# Patient Record
Sex: FEMALE | Race: WHITE | NOT HISPANIC OR LATINO | Employment: PART TIME | URBAN - METROPOLITAN AREA
[De-identification: names, ages, dates, MRNs, and addresses within clinical notes are randomized per-mention and may not be internally consistent; named-entity substitution may affect disease eponyms.]

---

## 2017-03-09 PROBLEM — L57.0 ACTINIC KERATOSIS: Status: ACTIVE | Noted: 2017-03-09

## 2017-03-09 PROBLEM — R53.82 CHRONIC FATIGUE: Chronic | Status: ACTIVE | Noted: 2017-03-09

## 2017-03-09 PROBLEM — L57.0 ACTINIC KERATOSIS: Chronic | Status: ACTIVE | Noted: 2017-03-09

## 2017-03-09 PROBLEM — E11.9 TYPE 2 DIABETES MELLITUS WITHOUT COMPLICATION: Chronic | Status: ACTIVE | Noted: 2017-03-09

## 2017-03-09 PROBLEM — R53.82 CHRONIC FATIGUE: Status: ACTIVE | Noted: 2017-03-09

## 2017-05-14 PROBLEM — M85.89 OSTEOPENIA OF MULTIPLE SITES: Status: ACTIVE | Noted: 2017-05-14

## 2017-09-13 PROBLEM — F10.90 ALCOHOL USE: Status: ACTIVE | Noted: 2017-09-13

## 2017-09-13 PROBLEM — Z78.9 ALCOHOL USE: Status: ACTIVE | Noted: 2017-09-13

## 2019-02-26 PROBLEM — I11.9 BENIGN HYPERTENSIVE HEART DISEASE WITHOUT HEART FAILURE: Status: ACTIVE | Noted: 2019-02-26

## 2019-04-24 ENCOUNTER — TELEPHONE (OUTPATIENT)
Dept: CARDIOLOGY | Facility: CLINIC | Age: 74
End: 2019-04-24

## 2019-04-24 NOTE — TELEPHONE ENCOUNTER
----- Message from Lizeth Jauregui sent at 4/24/2019  2:57 PM CDT -----  Contact: patient  Type: Needs Medical Advice    Who Called:  jose miguel  Symptoms (please be specific):  la  How long has patient had these symptoms:  la  Pharmacy name and phone #:  la  Best Call Back Number: [phone] (home)     Additional Information: Patient states that she would like new patient forms mailed to her before apt on may 22nd. .Please call to advise. Thanks!

## 2019-04-24 NOTE — TELEPHONE ENCOUNTER
Spoke to pt. Informed her every thing will be taking care of when she check in all forms are on the computer. Pt. Verbalized understanding.

## 2019-05-22 ENCOUNTER — OFFICE VISIT (OUTPATIENT)
Dept: CARDIOLOGY | Facility: CLINIC | Age: 74
End: 2019-05-22
Payer: MEDICARE

## 2019-05-22 VITALS
HEIGHT: 64 IN | WEIGHT: 180.75 LBS | BODY MASS INDEX: 30.86 KG/M2 | SYSTOLIC BLOOD PRESSURE: 121 MMHG | DIASTOLIC BLOOD PRESSURE: 74 MMHG | HEART RATE: 45 BPM

## 2019-05-22 DIAGNOSIS — I11.9 BENIGN HYPERTENSIVE HEART DISEASE WITHOUT HEART FAILURE: Primary | ICD-10-CM

## 2019-05-22 DIAGNOSIS — Z95.1 S/P CABG (CORONARY ARTERY BYPASS GRAFT): ICD-10-CM

## 2019-05-22 DIAGNOSIS — I25.10 ATHEROSCLEROSIS OF NATIVE CORONARY ARTERY OF NATIVE HEART WITHOUT ANGINA PECTORIS: Chronic | ICD-10-CM

## 2019-05-22 PROCEDURE — 1101F PR PT FALLS ASSESS DOC 0-1 FALLS W/OUT INJ PAST YR: ICD-10-PCS | Mod: CPTII,S$GLB,, | Performed by: INTERNAL MEDICINE

## 2019-05-22 PROCEDURE — 3044F PR MOST RECENT HEMOGLOBIN A1C LEVEL <7.0%: ICD-10-PCS | Mod: CPTII,S$GLB,, | Performed by: INTERNAL MEDICINE

## 2019-05-22 PROCEDURE — 99205 OFFICE O/P NEW HI 60 MIN: CPT | Mod: S$GLB,,, | Performed by: INTERNAL MEDICINE

## 2019-05-22 PROCEDURE — 3078F PR MOST RECENT DIASTOLIC BLOOD PRESSURE < 80 MM HG: ICD-10-PCS | Mod: CPTII,S$GLB,, | Performed by: INTERNAL MEDICINE

## 2019-05-22 PROCEDURE — 3074F SYST BP LT 130 MM HG: CPT | Mod: CPTII,S$GLB,, | Performed by: INTERNAL MEDICINE

## 2019-05-22 PROCEDURE — 3074F PR MOST RECENT SYSTOLIC BLOOD PRESSURE < 130 MM HG: ICD-10-PCS | Mod: CPTII,S$GLB,, | Performed by: INTERNAL MEDICINE

## 2019-05-22 PROCEDURE — 1101F PT FALLS ASSESS-DOCD LE1/YR: CPT | Mod: CPTII,S$GLB,, | Performed by: INTERNAL MEDICINE

## 2019-05-22 PROCEDURE — 99205 PR OFFICE/OUTPT VISIT, NEW, LEVL V, 60-74 MIN: ICD-10-PCS | Mod: S$GLB,,, | Performed by: INTERNAL MEDICINE

## 2019-05-22 PROCEDURE — 3044F HG A1C LEVEL LT 7.0%: CPT | Mod: CPTII,S$GLB,, | Performed by: INTERNAL MEDICINE

## 2019-05-22 PROCEDURE — 3078F DIAST BP <80 MM HG: CPT | Mod: CPTII,S$GLB,, | Performed by: INTERNAL MEDICINE

## 2019-05-22 PROCEDURE — 99999 PR PBB SHADOW E&M-EST. PATIENT-LVL III: ICD-10-PCS | Mod: PBBFAC,,, | Performed by: INTERNAL MEDICINE

## 2019-05-22 PROCEDURE — 99999 PR PBB SHADOW E&M-EST. PATIENT-LVL III: CPT | Mod: PBBFAC,,, | Performed by: INTERNAL MEDICINE

## 2019-05-22 RX ORDER — LOSARTAN POTASSIUM AND HYDROCHLOROTHIAZIDE 12.5; 5 MG/1; MG/1
1 TABLET ORAL DAILY
Qty: 90 TABLET | Refills: 3 | Status: SHIPPED | OUTPATIENT
Start: 2019-05-22 | End: 2019-10-23 | Stop reason: SDUPTHER

## 2019-05-22 RX ORDER — CARVEDILOL 6.25 MG/1
6.25 TABLET ORAL 2 TIMES DAILY WITH MEALS
Qty: 180 TABLET | Refills: 5 | Status: SHIPPED | OUTPATIENT
Start: 2019-05-22 | End: 2019-10-23 | Stop reason: SDUPTHER

## 2019-05-22 RX ORDER — METHIMAZOLE 5 MG/1
TABLET ORAL
Refills: 2 | COMMUNITY
Start: 2019-04-30 | End: 2020-08-25

## 2019-05-22 RX ORDER — GLUCOSAMINE/CHONDRO SU A 500-400 MG
1 TABLET ORAL 3 TIMES DAILY
COMMUNITY
End: 2020-09-15

## 2019-05-22 NOTE — PROGRESS NOTES
Subjective:    Patient ID:  Roseanne Gonzalez is a 73 y.o. female who presents for evaluation of Consult (establish care / heart history)      HPI  Here to establish/transfer care from Dr. Sommer concerning  CABG x4 (2007), DLP, HTN.  Had classic exertional angina a the time. Patients states is doing well no chest pain, SOB or change in exertional tolerence.       Review of Systems   Constitution: Negative for malaise/fatigue.   Eyes: Negative for blurred vision.   Cardiovascular: Negative for chest pain, claudication, cyanosis, dyspnea on exertion, irregular heartbeat, leg swelling, near-syncope, orthopnea, palpitations, paroxysmal nocturnal dyspnea and syncope.   Respiratory: Negative for cough and shortness of breath.    Hematologic/Lymphatic: Does not bruise/bleed easily.   Musculoskeletal: Negative for back pain, falls, joint pain, muscle cramps, muscle weakness and myalgias.   Gastrointestinal: Negative for abdominal pain, change in bowel habit, nausea and vomiting.   Genitourinary: Negative for urgency.   Neurological: Negative for dizziness, focal weakness and light-headedness.        Objective:    Physical Exam   Constitutional: She is oriented to person, place, and time. She appears well-developed and well-nourished.   HENT:   Head: Normocephalic.   Eyes: Conjunctivae are normal.   Neck: Normal range of motion. Neck supple. No JVD present.   Cardiovascular: Normal rate, regular rhythm, normal heart sounds and intact distal pulses.   Pulses:       Carotid pulses are 2+ on the right side, and 2+ on the left side.       Radial pulses are 2+ on the right side, and 2+ on the left side.        Dorsalis pedis pulses are 2+ on the right side, and 2+ on the left side.        Posterior tibial pulses are 2+ on the right side, and 2+ on the left side.   Pulmonary/Chest: Effort normal and breath sounds normal.   Abdominal: Soft. Bowel sounds are normal.   Musculoskeletal: She exhibits no edema or tenderness.    Neurological: She is alert and oriented to person, place, and time. Gait normal.   Skin: Skin is warm, dry and intact. No cyanosis. Nails show no clubbing.   Psychiatric: She has a normal mood and affect. Her speech is normal and behavior is normal. Thought content normal.   Nursing note and vitals reviewed.          .    Chemistry        Component Value Date/Time     03/01/2019 0812    K 4.5 03/01/2019 0812     03/01/2019 0812    CO2 26 03/01/2019 0812    BUN 22 03/01/2019 0812    CREATININE 0.85 03/01/2019 0812     (H) 03/01/2019 0812        Component Value Date/Time    CALCIUM 9.4 03/01/2019 0812    ALKPHOS 49 02/13/2019 1547    AST 28 02/13/2019 1547    ALT 33 02/13/2019 1547    BILITOT 0.6 02/13/2019 1547    ESTGFRAFRICA 80 03/01/2019 0812    EGFRNONAA 69 03/01/2019 0812            ..  Lab Results   Component Value Date    CHOL 151 03/01/2019    CHOL 109 (L) 11/06/2017    CHOL 135 08/31/2017     Lab Results   Component Value Date    HDL 61 03/01/2019    HDL 45 11/06/2017    HDL 45 08/31/2017     Lab Results   Component Value Date    LDLCALC 72 03/01/2019    LDLCALC 48.6 (L) 11/06/2017    LDLCALC 67.8 08/31/2017     Lab Results   Component Value Date    TRIG 93 03/01/2019    TRIG 77 11/06/2017    TRIG 111 08/31/2017     Lab Results   Component Value Date    CHOLHDL 2.5 03/01/2019    CHOLHDL 41.3 11/06/2017    CHOLHDL 33.3 08/31/2017     ..  Lab Results   Component Value Date    WBC 11.44 02/13/2019    HGB 16.3 (H) 02/13/2019    HCT 46.9 02/13/2019    MCV 92 02/13/2019     02/13/2019       Test(s) Reviewed  I have reviewed the following in detail:  [x] Stress test  10/2018- neg   [] Angiography   [x]      [] Labs   [] Other:       Assessment:         ICD-10-CM ICD-9-CM   1. Benign hypertensive heart disease without heart failure I11.9 402.10   2. Atherosclerosis of native coronary artery of native heart without angina pectoris I25.10 414.01   3. Uncontrolled type 2 diabetes mellitus  with diabetic peripheral angiopathy without gangrene, with long-term current use of insulin E11.51 250.72    E11.65 443.81    Z79.4 V58.67     Problem List Items Addressed This Visit     Uncontrolled type 2 diabetes mellitus with diabetic peripheral angiopathy without gangrene, with long-term current use of insulin (Chronic)    Coronary artery disease involving native coronary artery of native heart without angina pectoris (Chronic)    Benign hypertensive heart disease without heart failure - Primary           Plan:           Return to clinic 4 months   Low level/low impact aerobic exercise 5x's/wk. Heart healthy diet and risk factor modification.    See labs and med orders.  Decrease coreg  Dc aldactone/hctz add losartan/hctz for renal protection.    Portions of this note may have been created with voice recognition software.  Grammatical, syntax and spelling errors may be inevitable.

## 2019-10-16 ENCOUNTER — LAB VISIT (OUTPATIENT)
Dept: LAB | Facility: HOSPITAL | Age: 74
End: 2019-10-16
Attending: INTERNAL MEDICINE
Payer: MEDICARE

## 2019-10-16 ENCOUNTER — CLINICAL SUPPORT (OUTPATIENT)
Dept: CARDIOLOGY | Facility: CLINIC | Age: 74
End: 2019-10-16
Attending: INTERNAL MEDICINE
Payer: MEDICARE

## 2019-10-16 DIAGNOSIS — I11.9 BENIGN HYPERTENSIVE HEART DISEASE WITHOUT HEART FAILURE: ICD-10-CM

## 2019-10-16 DIAGNOSIS — I25.10 ATHEROSCLEROSIS OF NATIVE CORONARY ARTERY OF NATIVE HEART WITHOUT ANGINA PECTORIS: ICD-10-CM

## 2019-10-16 DIAGNOSIS — Z95.1 S/P CABG (CORONARY ARTERY BYPASS GRAFT): ICD-10-CM

## 2019-10-16 DIAGNOSIS — I25.10 ATHEROSCLEROSIS OF NATIVE CORONARY ARTERY OF NATIVE HEART WITHOUT ANGINA PECTORIS: Chronic | ICD-10-CM

## 2019-10-16 LAB
ALBUMIN SERPL BCP-MCNC: 4.2 G/DL (ref 3.5–5.2)
ALP SERPL-CCNC: 51 U/L (ref 55–135)
ALT SERPL W/O P-5'-P-CCNC: 19 U/L (ref 10–44)
ANION GAP SERPL CALC-SCNC: 8 MMOL/L (ref 8–16)
AST SERPL-CCNC: 24 U/L (ref 10–40)
BILIRUB SERPL-MCNC: 0.6 MG/DL (ref 0.1–1)
BUN SERPL-MCNC: 14 MG/DL (ref 8–23)
CALCIUM SERPL-MCNC: 9.6 MG/DL (ref 8.7–10.5)
CHLORIDE SERPL-SCNC: 103 MMOL/L (ref 95–110)
CHOLEST SERPL-MCNC: 159 MG/DL (ref 120–199)
CHOLEST/HDLC SERPL: 3.1 {RATIO} (ref 2–5)
CO2 SERPL-SCNC: 30 MMOL/L (ref 23–29)
CREAT SERPL-MCNC: 0.9 MG/DL (ref 0.5–1.4)
EST. GFR  (AFRICAN AMERICAN): >60 ML/MIN/1.73 M^2
EST. GFR  (NON AFRICAN AMERICAN): >60 ML/MIN/1.73 M^2
GLUCOSE SERPL-MCNC: 115 MG/DL (ref 70–110)
HDLC SERPL-MCNC: 51 MG/DL (ref 40–75)
HDLC SERPL: 32.1 % (ref 20–50)
LDLC SERPL CALC-MCNC: 90 MG/DL (ref 63–159)
NONHDLC SERPL-MCNC: 108 MG/DL
POTASSIUM SERPL-SCNC: 4.2 MMOL/L (ref 3.5–5.1)
PROT SERPL-MCNC: 6.8 G/DL (ref 6–8.4)
SODIUM SERPL-SCNC: 141 MMOL/L (ref 136–145)
TRIGL SERPL-MCNC: 90 MG/DL (ref 30–150)

## 2019-10-16 PROCEDURE — 36415 COLL VENOUS BLD VENIPUNCTURE: CPT | Mod: PO

## 2019-10-16 PROCEDURE — 93880 EXTRACRANIAL BILAT STUDY: CPT | Mod: S$GLB,,, | Performed by: INTERNAL MEDICINE

## 2019-10-16 PROCEDURE — 93880 CV US DOPPLER CAROTID (CUPID ONLY): ICD-10-PCS | Mod: S$GLB,,, | Performed by: INTERNAL MEDICINE

## 2019-10-16 PROCEDURE — 80061 LIPID PANEL: CPT

## 2019-10-16 PROCEDURE — 80053 COMPREHEN METABOLIC PANEL: CPT

## 2019-10-17 LAB
LEFT ARM DIASTOLIC BLOOD PRESSURE: 70 MMHG
LEFT ARM SYSTOLIC BLOOD PRESSURE: 125 MMHG
LEFT CBA DIAS: 20 CM/S
LEFT CBA SYS: 69 CM/S
LEFT CCA DIST DIAS: 16 CM/S
LEFT CCA DIST SYS: 62 CM/S
LEFT CCA MID DIAS: 17 CM/S
LEFT CCA MID SYS: 65 CM/S
LEFT CCA PROX DIAS: 21 CM/S
LEFT CCA PROX SYS: 93 CM/S
LEFT ECA DIAS: 12 CM/S
LEFT ECA SYS: 72 CM/S
LEFT ICA DIST DIAS: 14 CM/S
LEFT ICA DIST SYS: 46 CM/S
LEFT ICA MID DIAS: 25 CM/S
LEFT ICA MID SYS: 77 CM/S
LEFT ICA PROX DIAS: 20 CM/S
LEFT ICA PROX SYS: 75 CM/S
LEFT VERTEBRAL DIAS: 13 CM/S
LEFT VERTEBRAL SYS: 54 CM/S
OHS CV CAROTID RIGHT ICA EDV HIGHEST: 24
OHS CV CAROTID ULTRASOUND LEFT ICA/CCA RATIO: 0.83
OHS CV CAROTID ULTRASOUND RIGHT ICA/CCA RATIO: 1.14
OHS CV PV CAROTID LEFT HIGHEST CCA: 93
OHS CV PV CAROTID LEFT HIGHEST ICA: 77
OHS CV PV CAROTID RIGHT HIGHEST CCA: 77
OHS CV PV CAROTID RIGHT HIGHEST ICA: 88
OHS CV US CAROTID LEFT HIGHEST EDV: 25
RIGHT ARM DIASTOLIC BLOOD PRESSURE: 75 MMHG
RIGHT ARM SYSTOLIC BLOOD PRESSURE: 120 MMHG
RIGHT CBA DIAS: 18 CM/S
RIGHT CBA SYS: 59 CM/S
RIGHT CCA DIST DIAS: 15 CM/S
RIGHT CCA DIST SYS: 63 CM/S
RIGHT CCA MID DIAS: 21 CM/S
RIGHT CCA MID SYS: 77 CM/S
RIGHT CCA PROX DIAS: 13 CM/S
RIGHT CCA PROX SYS: 67 CM/S
RIGHT ECA DIAS: 9 CM/S
RIGHT ECA SYS: 74 CM/S
RIGHT ICA DIST DIAS: 20 CM/S
RIGHT ICA DIST SYS: 61 CM/S
RIGHT ICA MID DIAS: 24 CM/S
RIGHT ICA MID SYS: 82 CM/S
RIGHT ICA PROX DIAS: 23 CM/S
RIGHT ICA PROX SYS: 88 CM/S
RIGHT VERTEBRAL DIAS: 10 CM/S
RIGHT VERTEBRAL SYS: 52 CM/S

## 2019-10-18 ENCOUNTER — TELEPHONE (OUTPATIENT)
Dept: CARDIOLOGY | Facility: CLINIC | Age: 74
End: 2019-10-18

## 2019-10-18 NOTE — TELEPHONE ENCOUNTER
----- Message from Destinee Gilmore sent at 10/18/2019  9:34 AM CDT -----  Contact: Patient  Type: Needs Medical Advice    Who Called:  Patient  Best Call Back Number: [phone] (home)   Additional Information: Patient would like to get a call back test results please

## 2019-10-18 NOTE — TELEPHONE ENCOUNTER
----- Message from Crystal Do sent at 10/18/2019 10:44 AM CDT -----  Contact: pt  Pt missed a call and would the nurse to return their call.    Pt can be reached at [phone]

## 2019-10-23 ENCOUNTER — OFFICE VISIT (OUTPATIENT)
Dept: CARDIOLOGY | Facility: CLINIC | Age: 74
End: 2019-10-23
Attending: INTERNAL MEDICINE
Payer: MEDICARE

## 2019-10-23 VITALS
HEIGHT: 64 IN | HEART RATE: 60 BPM | DIASTOLIC BLOOD PRESSURE: 79 MMHG | WEIGHT: 181.44 LBS | SYSTOLIC BLOOD PRESSURE: 135 MMHG | BODY MASS INDEX: 30.98 KG/M2

## 2019-10-23 DIAGNOSIS — I25.10 CORONARY ARTERY DISEASE INVOLVING NATIVE CORONARY ARTERY OF NATIVE HEART WITHOUT ANGINA PECTORIS: Chronic | ICD-10-CM

## 2019-10-23 DIAGNOSIS — I10 ESSENTIAL (PRIMARY) HYPERTENSION: Chronic | ICD-10-CM

## 2019-10-23 DIAGNOSIS — Z95.1 S/P CABG (CORONARY ARTERY BYPASS GRAFT): Primary | ICD-10-CM

## 2019-10-23 PROCEDURE — 3078F DIAST BP <80 MM HG: CPT | Mod: CPTII,S$GLB,, | Performed by: INTERNAL MEDICINE

## 2019-10-23 PROCEDURE — 3075F SYST BP GE 130 - 139MM HG: CPT | Mod: CPTII,S$GLB,, | Performed by: INTERNAL MEDICINE

## 2019-10-23 PROCEDURE — 99214 PR OFFICE/OUTPT VISIT, EST, LEVL IV, 30-39 MIN: ICD-10-PCS | Mod: S$GLB,,, | Performed by: INTERNAL MEDICINE

## 2019-10-23 PROCEDURE — 3078F PR MOST RECENT DIASTOLIC BLOOD PRESSURE < 80 MM HG: ICD-10-PCS | Mod: CPTII,S$GLB,, | Performed by: INTERNAL MEDICINE

## 2019-10-23 PROCEDURE — 3075F PR MOST RECENT SYSTOLIC BLOOD PRESS GE 130-139MM HG: ICD-10-PCS | Mod: CPTII,S$GLB,, | Performed by: INTERNAL MEDICINE

## 2019-10-23 PROCEDURE — 99999 PR PBB SHADOW E&M-EST. PATIENT-LVL III: CPT | Mod: PBBFAC,,, | Performed by: INTERNAL MEDICINE

## 2019-10-23 PROCEDURE — 99214 OFFICE O/P EST MOD 30 MIN: CPT | Mod: S$GLB,,, | Performed by: INTERNAL MEDICINE

## 2019-10-23 PROCEDURE — 1101F PR PT FALLS ASSESS DOC 0-1 FALLS W/OUT INJ PAST YR: ICD-10-PCS | Mod: CPTII,S$GLB,, | Performed by: INTERNAL MEDICINE

## 2019-10-23 PROCEDURE — 99999 PR PBB SHADOW E&M-EST. PATIENT-LVL III: ICD-10-PCS | Mod: PBBFAC,,, | Performed by: INTERNAL MEDICINE

## 2019-10-23 PROCEDURE — 1101F PT FALLS ASSESS-DOCD LE1/YR: CPT | Mod: CPTII,S$GLB,, | Performed by: INTERNAL MEDICINE

## 2019-10-23 RX ORDER — ROSUVASTATIN CALCIUM 10 MG/1
10 TABLET, COATED ORAL NIGHTLY
Qty: 90 TABLET | Refills: 4 | Status: SHIPPED | OUTPATIENT
Start: 2019-10-23 | End: 2020-06-22 | Stop reason: DRUGHIGH

## 2019-10-23 RX ORDER — CLOPIDOGREL BISULFATE 75 MG/1
75 TABLET ORAL DAILY
Qty: 90 TABLET | Refills: 4 | Status: SHIPPED | OUTPATIENT
Start: 2019-10-23 | End: 2020-10-22

## 2019-10-23 RX ORDER — ASPIRIN 81 MG/1
81 TABLET ORAL DAILY
Qty: 30 TABLET | Refills: 11
Start: 2019-10-23 | End: 2020-10-22

## 2019-10-23 RX ORDER — LOSARTAN POTASSIUM AND HYDROCHLOROTHIAZIDE 12.5; 5 MG/1; MG/1
1 TABLET ORAL DAILY
Qty: 90 TABLET | Refills: 3 | Status: SHIPPED | OUTPATIENT
Start: 2019-10-23 | End: 2020-10-28

## 2019-10-23 RX ORDER — CARVEDILOL 6.25 MG/1
6.25 TABLET ORAL 2 TIMES DAILY WITH MEALS
Qty: 180 TABLET | Refills: 5 | Status: SHIPPED | OUTPATIENT
Start: 2019-10-23 | End: 2020-11-05 | Stop reason: SDUPTHER

## 2019-10-23 NOTE — PROGRESS NOTES
Subjective:    Patient ID:  Roseanne Gonzalez is a 74 y.o. female who presents for follow-up of No chief complaint on file.      HPI  Here for follow up of CABG x4 (2007), DLP, HTN.  Had classic exertional angina a the time. Patients states is doing well no chest pain, SOB or change in exertional tolerence.     Review of Systems   Constitution: Negative for malaise/fatigue.   Eyes: Negative for blurred vision.   Cardiovascular: Negative for chest pain, claudication, cyanosis, dyspnea on exertion, irregular heartbeat, leg swelling, near-syncope, orthopnea, palpitations, paroxysmal nocturnal dyspnea and syncope.   Respiratory: Negative for cough and shortness of breath.    Hematologic/Lymphatic: Does not bruise/bleed easily.   Musculoskeletal: Negative for back pain, falls, joint pain, muscle cramps, muscle weakness and myalgias.   Gastrointestinal: Negative for abdominal pain, change in bowel habit, nausea and vomiting.   Genitourinary: Negative for urgency.   Neurological: Negative for dizziness, focal weakness and light-headedness.       Past Medical History:   Diagnosis Date    Allergic urticaria     Benign hypertensive heart disease without heart failure     Benign hypertensive heart disease without heart failure     Cervicalgia     Coronary artery disease involving native coronary artery of native heart without angina pectoris 6/17/2015 2007 [facility]    Coronary atherosclerosis of native coronary artery     Coronary atherosclerosis of native coronary artery     Coronary atherosclerosis of native coronary artery     Diabetes mellitus, type 2     Dizziness and giddiness     Encounter for long-term (current) use of insulin     Esophageal reflux     Essential (primary) hypertension 6/17/2015    Fibrocystic breast     Headache(784.0)     Insomnia, unspecified     Osteopenia of multiple sites 5/14/2017    Other and unspecified hyperlipidemia     Pain in joint, ankle and foot     S/P CABG (coronary  artery bypass graft) 5/22/2019    CABGX4 2007 Dr Collins    Spondylosis of unspecified site without mention of myelopathy     Type II or unspecified type diabetes mellitus with peripheral circulatory disorders, not stated as uncontrolled(250.70)     Type II or unspecified type diabetes mellitus with peripheral circulatory disorders, not stated as uncontrolled(250.70)     Type II or unspecified type diabetes mellitus without mention of complication, not stated as uncontrolled     Uncontrolled type 2 diabetes mellitus without complication, without long-term current use of insulin 3/9/2017    Unspecified essential hypertension     Vertigo 02/13/2019          Objective:     There were no vitals filed for this visit.     Physical Exam   Constitutional: She is oriented to person, place, and time. She appears well-developed and well-nourished.   HENT:   Head: Normocephalic.   Eyes: Conjunctivae are normal.   Neck: Normal range of motion. Neck supple. No JVD present.   Cardiovascular: Normal rate, regular rhythm, normal heart sounds and intact distal pulses.   Pulses:       Carotid pulses are 2+ on the right side, and 2+ on the left side.       Radial pulses are 2+ on the right side, and 2+ on the left side.        Dorsalis pedis pulses are 2+ on the right side, and 2+ on the left side.        Posterior tibial pulses are 2+ on the right side, and 2+ on the left side.   Pulmonary/Chest: Effort normal and breath sounds normal.   Abdominal: Soft. Bowel sounds are normal.   Musculoskeletal: She exhibits no edema or tenderness.   Neurological: She is alert and oriented to person, place, and time. Gait normal.   Skin: Skin is warm, dry and intact. No cyanosis. Nails show no clubbing.   Psychiatric: She has a normal mood and affect. Her speech is normal and behavior is normal. Thought content normal.   Nursing note and vitals reviewed.            ..    Chemistry        Component Value Date/Time     10/16/2019 0923    K  4.2 10/16/2019 0923     10/16/2019 0923    CO2 30 (H) 10/16/2019 0923    BUN 14 10/16/2019 0923    CREATININE 0.9 10/16/2019 0923     (H) 10/16/2019 0923        Component Value Date/Time    CALCIUM 9.6 10/16/2019 0923    ALKPHOS 51 (L) 10/16/2019 0923    AST 24 10/16/2019 0923    ALT 19 10/16/2019 0923    BILITOT 0.6 10/16/2019 0923    ESTGFRAFRICA >60.0 10/16/2019 0923    EGFRNONAA >60.0 10/16/2019 0923            ..  Lab Results   Component Value Date    CHOL 159 10/16/2019    CHOL 151 03/01/2019    CHOL 109 (L) 11/06/2017     Lab Results   Component Value Date    HDL 51 10/16/2019    HDL 61 03/01/2019    HDL 45 11/06/2017     Lab Results   Component Value Date    LDLCALC 90.0 10/16/2019    LDLCALC 72 03/01/2019    LDLCALC 48.6 (L) 11/06/2017     Lab Results   Component Value Date    TRIG 90 10/16/2019    TRIG 93 03/01/2019    TRIG 77 11/06/2017     Lab Results   Component Value Date    CHOLHDL 32.1 10/16/2019    CHOLHDL 2.5 03/01/2019    CHOLHDL 41.3 11/06/2017     ..  Lab Results   Component Value Date    WBC 8.27 10/16/2019    HGB 15.4 10/16/2019    HCT 46.2 10/16/2019    MCV 95 10/16/2019     10/16/2019       Test(s) Reviewed  I have reviewed the following in detail:  [] Stress test   [] Angiography   [] Echocardiogram   [] Labs   [] Other:       Assessment:         ICD-10-CM ICD-9-CM   1. S/P CABG (coronary artery bypass graft) Z95.1 V45.81   2. Essential (primary) hypertension I10 401.9   3. Coronary artery disease involving native coronary artery of native heart without angina pectoris I25.10 414.01     Problem List Items Addressed This Visit     S/P CABG (coronary artery bypass graft) - Primary    Overview     CABGX4 2007 Dr Collins         Essential (primary) hypertension (Chronic)    Coronary artery disease involving native coronary artery of native heart without angina pectoris (Chronic)    Overview     2007 [facility]                Plan:           Return to clinic 9 months   Low level/low  impact aerobic exercise 5x's/wk. Heart healthy diet and risk factor modification.    See labs and med orders.      Portions of this note may have been created with voice recognition software.  Grammatical, syntax and spelling errors may be inevitable.

## 2020-01-14 PROBLEM — M75.102 ROTATOR CUFF SYNDROME, LEFT: Status: ACTIVE | Noted: 2020-01-14

## 2020-04-16 PROBLEM — E05.90 HYPERTHYROIDISM: Chronic | Status: ACTIVE | Noted: 2020-04-16

## 2020-04-16 PROBLEM — E11.69 TYPE 2 DIABETES MELLITUS WITH HYPERCHOLESTEROLEMIA: Chronic | Status: ACTIVE | Noted: 2020-04-16

## 2020-04-16 PROBLEM — E78.00 TYPE 2 DIABETES MELLITUS WITH HYPERCHOLESTEROLEMIA: Chronic | Status: ACTIVE | Noted: 2020-04-16

## 2020-04-20 PROBLEM — E11.51 TYPE II OR UNSPECIFIED TYPE DIABETES MELLITUS WITH PERIPHERAL CIRCULATORY DISORDERS, NOT STATED AS UNCONTROLLED(250.70): Status: ACTIVE | Noted: 2020-04-20

## 2020-04-21 PROBLEM — E11.51 TYPE II OR UNSPECIFIED TYPE DIABETES MELLITUS WITH PERIPHERAL CIRCULATORY DISORDERS, NOT STATED AS UNCONTROLLED(250.70): Status: RESOLVED | Noted: 2020-04-20 | Resolved: 2020-04-21

## 2020-10-19 PROBLEM — Z79.82 LONG TERM (CURRENT) USE OF ASPIRIN: Status: ACTIVE | Noted: 2020-10-19

## 2020-10-19 PROBLEM — Z79.899 CURRENT USE OF PROTON PUMP INHIBITOR: Status: ACTIVE | Noted: 2020-10-19

## 2020-10-19 PROBLEM — E66.09 CLASS 1 OBESITY DUE TO EXCESS CALORIES WITH SERIOUS COMORBIDITY AND BODY MASS INDEX (BMI) OF 31.0 TO 31.9 IN ADULT: Status: ACTIVE | Noted: 2020-10-19

## 2020-10-19 PROBLEM — E11.65 UNCONTROLLED TYPE 2 DIABETES MELLITUS WITH HYPERGLYCEMIA: Status: ACTIVE | Noted: 2020-10-19

## 2020-10-19 PROBLEM — E66.811 CLASS 1 OBESITY DUE TO EXCESS CALORIES WITH SERIOUS COMORBIDITY AND BODY MASS INDEX (BMI) OF 31.0 TO 31.9 IN ADULT: Status: ACTIVE | Noted: 2020-10-19

## 2020-10-19 PROBLEM — Z79.899 ON STATIN THERAPY: Status: ACTIVE | Noted: 2020-10-19

## 2020-10-28 RX ORDER — LOSARTAN POTASSIUM AND HYDROCHLOROTHIAZIDE 12.5; 5 MG/1; MG/1
TABLET ORAL
Qty: 90 TABLET | Refills: 4 | Status: SHIPPED | OUTPATIENT
Start: 2020-10-28 | End: 2020-11-05

## 2020-11-05 ENCOUNTER — OFFICE VISIT (OUTPATIENT)
Dept: CARDIOLOGY | Facility: CLINIC | Age: 75
End: 2020-11-05
Payer: MEDICARE

## 2020-11-05 VITALS
HEIGHT: 64 IN | DIASTOLIC BLOOD PRESSURE: 62 MMHG | SYSTOLIC BLOOD PRESSURE: 119 MMHG | WEIGHT: 182.13 LBS | HEART RATE: 56 BPM | BODY MASS INDEX: 31.09 KG/M2

## 2020-11-05 DIAGNOSIS — I10 ESSENTIAL (PRIMARY) HYPERTENSION: Chronic | ICD-10-CM

## 2020-11-05 DIAGNOSIS — I25.10 CORONARY ARTERY DISEASE INVOLVING NATIVE CORONARY ARTERY OF NATIVE HEART WITHOUT ANGINA PECTORIS: Chronic | ICD-10-CM

## 2020-11-05 DIAGNOSIS — E78.00 TYPE 2 DIABETES MELLITUS WITH HYPERCHOLESTEROLEMIA: Chronic | ICD-10-CM

## 2020-11-05 DIAGNOSIS — E11.69 TYPE 2 DIABETES MELLITUS WITH HYPERCHOLESTEROLEMIA: Chronic | ICD-10-CM

## 2020-11-05 DIAGNOSIS — Z95.1 S/P CABG (CORONARY ARTERY BYPASS GRAFT): Primary | ICD-10-CM

## 2020-11-05 PROCEDURE — 1159F MED LIST DOCD IN RCRD: CPT | Mod: S$GLB,,, | Performed by: INTERNAL MEDICINE

## 2020-11-05 PROCEDURE — 1126F PR PAIN SEVERITY QUANTIFIED, NO PAIN PRESENT: ICD-10-PCS | Mod: S$GLB,,, | Performed by: INTERNAL MEDICINE

## 2020-11-05 PROCEDURE — 3074F PR MOST RECENT SYSTOLIC BLOOD PRESSURE < 130 MM HG: ICD-10-PCS | Mod: S$GLB,,, | Performed by: INTERNAL MEDICINE

## 2020-11-05 PROCEDURE — 3078F DIAST BP <80 MM HG: CPT | Mod: S$GLB,,, | Performed by: INTERNAL MEDICINE

## 2020-11-05 PROCEDURE — 99999 PR PBB SHADOW E&M-EST. PATIENT-LVL IV: CPT | Mod: PBBFAC,,, | Performed by: INTERNAL MEDICINE

## 2020-11-05 PROCEDURE — 3078F PR MOST RECENT DIASTOLIC BLOOD PRESSURE < 80 MM HG: ICD-10-PCS | Mod: S$GLB,,, | Performed by: INTERNAL MEDICINE

## 2020-11-05 PROCEDURE — 1126F AMNT PAIN NOTED NONE PRSNT: CPT | Mod: S$GLB,,, | Performed by: INTERNAL MEDICINE

## 2020-11-05 PROCEDURE — 1101F PT FALLS ASSESS-DOCD LE1/YR: CPT | Mod: S$GLB,,, | Performed by: INTERNAL MEDICINE

## 2020-11-05 PROCEDURE — 3074F SYST BP LT 130 MM HG: CPT | Mod: S$GLB,,, | Performed by: INTERNAL MEDICINE

## 2020-11-05 PROCEDURE — 3008F BODY MASS INDEX DOCD: CPT | Mod: S$GLB,,, | Performed by: INTERNAL MEDICINE

## 2020-11-05 PROCEDURE — 1101F PR PT FALLS ASSESS DOC 0-1 FALLS W/OUT INJ PAST YR: ICD-10-PCS | Mod: S$GLB,,, | Performed by: INTERNAL MEDICINE

## 2020-11-05 PROCEDURE — 99999 PR PBB SHADOW E&M-EST. PATIENT-LVL IV: ICD-10-PCS | Mod: PBBFAC,,, | Performed by: INTERNAL MEDICINE

## 2020-11-05 PROCEDURE — 3008F PR BODY MASS INDEX (BMI) DOCUMENTED: ICD-10-PCS | Mod: S$GLB,,, | Performed by: INTERNAL MEDICINE

## 2020-11-05 PROCEDURE — 3052F HG A1C>EQUAL 8.0%<EQUAL 9.0%: CPT | Mod: S$GLB,,, | Performed by: INTERNAL MEDICINE

## 2020-11-05 PROCEDURE — 1159F PR MEDICATION LIST DOCUMENTED IN MEDICAL RECORD: ICD-10-PCS | Mod: S$GLB,,, | Performed by: INTERNAL MEDICINE

## 2020-11-05 PROCEDURE — 99214 OFFICE O/P EST MOD 30 MIN: CPT | Mod: S$GLB,,, | Performed by: INTERNAL MEDICINE

## 2020-11-05 PROCEDURE — 3052F PR MOST RECENT HEMOGLOBIN A1C LEVEL 8.0 - < 9.0%: ICD-10-PCS | Mod: S$GLB,,, | Performed by: INTERNAL MEDICINE

## 2020-11-05 PROCEDURE — 99214 PR OFFICE/OUTPT VISIT, EST, LEVL IV, 30-39 MIN: ICD-10-PCS | Mod: S$GLB,,, | Performed by: INTERNAL MEDICINE

## 2020-11-05 RX ORDER — CARVEDILOL 6.25 MG/1
6.25 TABLET ORAL 2 TIMES DAILY WITH MEALS
Qty: 180 TABLET | Refills: 5 | Status: SHIPPED | OUTPATIENT
Start: 2020-11-05 | End: 2021-05-07

## 2020-11-05 RX ORDER — SPIRONOLACTONE 25 MG/1
25 TABLET ORAL DAILY
Qty: 90 TABLET | Refills: 5 | Status: SHIPPED | OUTPATIENT
Start: 2020-11-05 | End: 2021-05-07

## 2020-11-05 RX ORDER — ROSUVASTATIN CALCIUM 20 MG/1
20 TABLET, COATED ORAL DAILY
Qty: 90 TABLET | Refills: 3 | Status: SHIPPED | OUTPATIENT
Start: 2020-11-05 | End: 2021-05-07 | Stop reason: SDUPTHER

## 2020-11-05 RX ORDER — CLOPIDOGREL BISULFATE 75 MG/1
75 TABLET ORAL ONCE
Qty: 90 TABLET | Refills: 4 | Status: SHIPPED | OUTPATIENT
Start: 2020-11-05 | End: 2021-02-03

## 2020-11-05 NOTE — PROGRESS NOTES
Subjective:    Patient ID:  Roseanne Gonzalez is a 75 y.o. female who presents for follow-up of No chief complaint on file.      HPI  Here for follow up of CABG x4 (2007), DLP, HTN.  Had classic exertional angina a the time. Patients states is doing well no chest pain, SOB or change in exertional tolerence.   No change in exertional tolerance vs last visit.       Review of Systems   Constitution: Negative for malaise/fatigue.   Eyes: Negative for blurred vision.   Cardiovascular: Negative for chest pain, claudication, cyanosis, dyspnea on exertion, irregular heartbeat, leg swelling, near-syncope, orthopnea, palpitations, paroxysmal nocturnal dyspnea and syncope.   Respiratory: Negative for cough and shortness of breath.    Hematologic/Lymphatic: Does not bruise/bleed easily.   Musculoskeletal: Negative for back pain, falls, joint pain, muscle cramps, muscle weakness and myalgias.   Gastrointestinal: Negative for abdominal pain, change in bowel habit, nausea and vomiting.   Genitourinary: Negative for urgency.   Neurological: Negative for dizziness, focal weakness and light-headedness.       Past Medical History:   Diagnosis Date    Allergic urticaria     Benign hypertensive heart disease without heart failure     Benign hypertensive heart disease without heart failure     Cervicalgia     Coronary artery disease involving native coronary artery of native heart without angina pectoris 6/17/2015 2007 [facility]    Coronary atherosclerosis of native coronary artery     Coronary atherosclerosis of native coronary artery     Coronary atherosclerosis of native coronary artery     Diabetes mellitus, type 2     Dizziness and giddiness     Encounter for long-term (current) use of insulin     Esophageal reflux     Essential (primary) hypertension 6/17/2015    Fibrocystic breast     Headache(784.0)     Insomnia, unspecified     Osteopenia of multiple sites 5/14/2017    Other and unspecified hyperlipidemia      Pain in joint, ankle and foot     S/P CABG (coronary artery bypass graft) 5/22/2019    CABGX4 2007 Dr Collins    Spondylosis of unspecified site without mention of myelopathy     Type II or unspecified type diabetes mellitus with peripheral circulatory disorders, not stated as uncontrolled(250.70)     Type II or unspecified type diabetes mellitus with peripheral circulatory disorders, not stated as uncontrolled(250.70)     Type II or unspecified type diabetes mellitus without mention of complication, not stated as uncontrolled     Uncontrolled type 2 diabetes mellitus without complication, without long-term current use of insulin 3/9/2017    Unspecified essential hypertension     Vertigo 02/13/2019     There are no hospital problems to display for this patient.       Objective:     Vitals:    11/05/20 1406   BP: 119/62   Pulse: (!) 56        Physical Exam   Constitutional: She is oriented to person, place, and time. She appears well-developed and well-nourished.   HENT:   Head: Normocephalic.   Eyes: Conjunctivae are normal.   Neck: Normal range of motion. Neck supple. No JVD present.   Cardiovascular: Normal rate, regular rhythm, normal heart sounds and intact distal pulses.   Pulses:       Carotid pulses are 2+ on the right side and 2+ on the left side.       Radial pulses are 2+ on the right side and 2+ on the left side.        Dorsalis pedis pulses are 2+ on the right side and 2+ on the left side.        Posterior tibial pulses are 2+ on the right side and 2+ on the left side.   Pulmonary/Chest: Effort normal and breath sounds normal.   Abdominal: Soft. Bowel sounds are normal.   Musculoskeletal:         General: No tenderness or edema.   Neurological: She is alert and oriented to person, place, and time. Gait normal.   Skin: Skin is warm, dry and intact. No cyanosis. Nails show no clubbing.   Psychiatric: She has a normal mood and affect. Her speech is normal and behavior is normal. Thought content  normal.   Nursing note and vitals reviewed.            ..    Chemistry        Component Value Date/Time     06/11/2020 0900    K 4.5 06/11/2020 0900     06/11/2020 0900    CO2 31 06/11/2020 0900    BUN 23 06/11/2020 0900    CREATININE 0.85 06/11/2020 0900     (H) 06/11/2020 0900        Component Value Date/Time    CALCIUM 9.6 06/11/2020 0900    ALKPHOS 53 06/11/2020 0900    AST 19 06/11/2020 0900    ALT 15 06/11/2020 0900    BILITOT 0.7 06/11/2020 0900    ESTGFRAFRICA 79 06/11/2020 0900    EGFRNONAA 68 06/11/2020 0900            ..  Lab Results   Component Value Date    CHOL 179 06/11/2020    CHOL 159 10/16/2019    CHOL 151 03/01/2019     Lab Results   Component Value Date    HDL 61 06/11/2020    HDL 51 10/16/2019    HDL 61 03/01/2019     Lab Results   Component Value Date    LDLCALC 98 06/11/2020    LDLCALC 90.0 10/16/2019    LDLCALC 72 03/01/2019     Lab Results   Component Value Date    TRIG 100 06/11/2020    TRIG 90 10/16/2019    TRIG 93 03/01/2019     Lab Results   Component Value Date    CHOLHDL 2.9 06/11/2020    CHOLHDL 32.1 10/16/2019    CHOLHDL 2.5 03/01/2019     ..  Lab Results   Component Value Date    WBC 7.8 06/11/2020    HGB 16.7 (H) 06/11/2020    HCT 49.9 (H) 06/11/2020    MCV 92.9 06/11/2020     06/11/2020       Test(s) Reviewed  I have reviewed the following in detail:  [] Stress test   [] Angiography   [x] Echocardiogram   [x] Labs   [] Other:       Assessment:         ICD-10-CM ICD-9-CM   1. S/P CABG (coronary artery bypass graft)  Z95.1 V45.81   2. Coronary artery disease involving native coronary artery of native heart without angina pectoris  I25.10 414.01   3. Essential (primary) hypertension  I10 401.9   4. Type 2 diabetes mellitus with hypercholesterolemia  E11.69 250.80    E78.00 272.0     Problem List Items Addressed This Visit     Essential (primary) hypertension (Chronic)    Coronary artery disease involving native coronary artery of native heart without angina  pectoris (Chronic)    Overview     2007 [facility]         Type 2 diabetes mellitus with hypercholesterolemia (Chronic)    S/P CABG (coronary artery bypass graft) - Primary    Overview     CABGX4 2007 Dr Collins                Plan:           Return to clinic 6 months   Low level/low impact aerobic exercise 5x's/wk. Heart healthy diet and risk factor modification.    See labs and med orders.  Lipids next visit, cfd    Portions of this note may have been created with voice recognition software.  Grammatical, syntax and spelling errors may be inevitable.

## 2021-01-06 ENCOUNTER — IMMUNIZATION (OUTPATIENT)
Dept: FAMILY MEDICINE | Facility: CLINIC | Age: 76
End: 2021-01-06
Payer: MEDICARE

## 2021-01-06 DIAGNOSIS — Z23 NEED FOR VACCINATION: ICD-10-CM

## 2021-01-06 PROCEDURE — 91300 COVID-19, MRNA, LNP-S, PF, 30 MCG/0.3 ML DOSE VACCINE: CPT | Mod: PBBFAC | Performed by: FAMILY MEDICINE

## 2021-01-27 ENCOUNTER — IMMUNIZATION (OUTPATIENT)
Dept: FAMILY MEDICINE | Facility: CLINIC | Age: 76
End: 2021-01-27
Payer: MEDICARE

## 2021-01-27 DIAGNOSIS — Z23 NEED FOR VACCINATION: Primary | ICD-10-CM

## 2021-01-27 PROCEDURE — 91300 COVID-19, MRNA, LNP-S, PF, 30 MCG/0.3 ML DOSE VACCINE: CPT | Mod: PBBFAC | Performed by: NURSE PRACTITIONER

## 2021-01-27 PROCEDURE — 0002A COVID-19, MRNA, LNP-S, PF, 30 MCG/0.3 ML DOSE VACCINE: CPT | Mod: PBBFAC | Performed by: NURSE PRACTITIONER

## 2021-02-03 RX ORDER — CLOPIDOGREL BISULFATE 75 MG/1
TABLET ORAL
Qty: 90 TABLET | Refills: 4 | Status: SHIPPED | OUTPATIENT
Start: 2021-02-03 | End: 2021-05-07 | Stop reason: SDUPTHER

## 2021-03-31 ENCOUNTER — TELEPHONE (OUTPATIENT)
Dept: CARDIOLOGY | Facility: CLINIC | Age: 76
End: 2021-03-31

## 2021-04-30 ENCOUNTER — CLINICAL SUPPORT (OUTPATIENT)
Dept: CARDIOLOGY | Facility: CLINIC | Age: 76
End: 2021-04-30
Attending: INTERNAL MEDICINE
Payer: MEDICARE

## 2021-04-30 ENCOUNTER — HOSPITAL ENCOUNTER (OUTPATIENT)
Dept: RADIOLOGY | Facility: HOSPITAL | Age: 76
Discharge: HOME | End: 2021-04-30
Attending: INTERNAL MEDICINE
Payer: MEDICARE

## 2021-04-30 VITALS — WEIGHT: 183 LBS | BODY MASS INDEX: 31.24 KG/M2 | HEIGHT: 64 IN

## 2021-04-30 VITALS — HEIGHT: 64 IN | BODY MASS INDEX: 31.24 KG/M2 | WEIGHT: 183 LBS

## 2021-04-30 DIAGNOSIS — E11.69 TYPE 2 DIABETES MELLITUS WITH HYPERCHOLESTEROLEMIA: Chronic | ICD-10-CM

## 2021-04-30 DIAGNOSIS — E78.00 TYPE 2 DIABETES MELLITUS WITH HYPERCHOLESTEROLEMIA: Chronic | ICD-10-CM

## 2021-04-30 DIAGNOSIS — E78.00 TYPE 2 DIABETES MELLITUS WITH HYPERCHOLESTEROLEMIA: ICD-10-CM

## 2021-04-30 DIAGNOSIS — E11.69 TYPE 2 DIABETES MELLITUS WITH HYPERCHOLESTEROLEMIA: ICD-10-CM

## 2021-04-30 DIAGNOSIS — I25.10 CORONARY ARTERY DISEASE INVOLVING NATIVE CORONARY ARTERY OF NATIVE HEART WITHOUT ANGINA PECTORIS: Chronic | ICD-10-CM

## 2021-04-30 DIAGNOSIS — Z95.1 S/P CABG (CORONARY ARTERY BYPASS GRAFT): ICD-10-CM

## 2021-04-30 DIAGNOSIS — I25.10 CORONARY ARTERY DISEASE INVOLVING NATIVE CORONARY ARTERY OF NATIVE HEART WITHOUT ANGINA PECTORIS: ICD-10-CM

## 2021-04-30 DIAGNOSIS — I10 ESSENTIAL (PRIMARY) HYPERTENSION: Chronic | ICD-10-CM

## 2021-04-30 DIAGNOSIS — I10 ESSENTIAL (PRIMARY) HYPERTENSION: ICD-10-CM

## 2021-04-30 PROCEDURE — 99999 PR PBB SHADOW E&M-EST. PATIENT-LVL II: ICD-10-PCS | Mod: PBBFAC,,,

## 2021-04-30 PROCEDURE — 93018 CV STRESS TEST I&R ONLY: CPT | Mod: ,,, | Performed by: INTERNAL MEDICINE

## 2021-04-30 PROCEDURE — 93016 CV STRESS TEST SUPVJ ONLY: CPT | Mod: ,,, | Performed by: INTERNAL MEDICINE

## 2021-04-30 PROCEDURE — 78452 HT MUSCLE IMAGE SPECT MULT: CPT | Mod: PO

## 2021-04-30 PROCEDURE — A9502 TC99M TETROFOSMIN: HCPCS | Mod: PO

## 2021-04-30 PROCEDURE — 93016 STRESS TEST WITH MYOCARDIAL PERFUSION (CUPID ONLY): ICD-10-PCS | Mod: ,,, | Performed by: INTERNAL MEDICINE

## 2021-04-30 PROCEDURE — 99999 PR PBB SHADOW E&M-EST. PATIENT-LVL II: CPT | Mod: PBBFAC,,,

## 2021-04-30 PROCEDURE — 78452 STRESS TEST WITH MYOCARDIAL PERFUSION (CUPID ONLY): ICD-10-PCS | Mod: 26,,, | Performed by: INTERNAL MEDICINE

## 2021-04-30 PROCEDURE — 93017 CV STRESS TEST TRACING ONLY: CPT | Mod: PO

## 2021-04-30 PROCEDURE — 93306 TTE W/DOPPLER COMPLETE: CPT | Mod: S$GLB,,, | Performed by: INTERNAL MEDICINE

## 2021-04-30 PROCEDURE — 93306 ECHO (CUPID ONLY): ICD-10-PCS | Mod: S$GLB,,, | Performed by: INTERNAL MEDICINE

## 2021-04-30 PROCEDURE — 78452 HT MUSCLE IMAGE SPECT MULT: CPT | Mod: 26,,, | Performed by: INTERNAL MEDICINE

## 2021-04-30 PROCEDURE — 93018 PR CARDIAC STRESS TST,INTERP/REPT ONLY: ICD-10-PCS | Mod: ,,, | Performed by: INTERNAL MEDICINE

## 2021-04-30 RX ORDER — REGADENOSON 0.08 MG/ML
0.4 INJECTION, SOLUTION INTRAVENOUS ONCE
Status: COMPLETED | OUTPATIENT
Start: 2021-04-30 | End: 2021-04-30

## 2021-04-30 RX ORDER — AMINOPHYLLINE 25 MG/ML
75 INJECTION, SOLUTION INTRAVENOUS
Status: COMPLETED | OUTPATIENT
Start: 2021-04-30 | End: 2021-04-30

## 2021-04-30 RX ADMIN — REGADENOSON 0.4 MG: 0.08 INJECTION, SOLUTION INTRAVENOUS at 09:04

## 2021-04-30 RX ADMIN — AMINOPHYLLINE 75 MG: 25 INJECTION, SOLUTION INTRAVENOUS at 09:04

## 2021-05-03 LAB
ASCENDING AORTA: 3.17 CM
AV INDEX (PROSTH): 0.6
AV MEAN GRADIENT: 5 MMHG
AV PEAK GRADIENT: 12 MMHG
AV VALVE AREA: 1.9 CM2
AV VELOCITY RATIO: 0.6
BSA FOR ECHO PROCEDURE: 1.94 M2
CV ECHO LV RWT: 0.4 CM
CV PHARM DOSE: 0.4 MG
CV STRESS BASE HR: 45 BPM
DIASTOLIC BLOOD PRESSURE: 70 MMHG
DOP CALC AO PEAK VEL: 1.71 M/S
DOP CALC AO VTI: 43.36 CM
DOP CALC LVOT AREA: 3.2 CM2
DOP CALC LVOT DIAMETER: 2.01 CM
DOP CALC LVOT PEAK VEL: 1.02 M/S
DOP CALC LVOT STROKE VOLUME: 82.52 CM3
DOP CALCLVOT PEAK VEL VTI: 26.02 CM
E WAVE DECELERATION TIME: 194.14 MSEC
E/A RATIO: 2.11
E/E' RATIO: 11.88 M/S
ECHO LV POSTERIOR WALL: 0.93 CM (ref 0.6–1.1)
EJECTION FRACTION: 60 %
FRACTIONAL SHORTENING: 37 % (ref 28–44)
INTERVENTRICULAR SEPTUM: 0.94 CM (ref 0.6–1.1)
LA MAJOR: 5.19 CM
LA MINOR: 5.37 CM
LA WIDTH: 3.82 CM
LEFT ATRIUM SIZE: 3.58 CM
LEFT ATRIUM VOLUME INDEX: 32.6 ML/M2
LEFT ATRIUM VOLUME: 61.36 CM3
LEFT INTERNAL DIMENSION IN SYSTOLE: 2.94 CM (ref 2.1–4)
LEFT VENTRICLE DIASTOLIC VOLUME INDEX: 54.55 ML/M2
LEFT VENTRICLE DIASTOLIC VOLUME: 102.55 ML
LEFT VENTRICLE MASS INDEX: 80 G/M2
LEFT VENTRICLE SYSTOLIC VOLUME INDEX: 17.7 ML/M2
LEFT VENTRICLE SYSTOLIC VOLUME: 33.2 ML
LEFT VENTRICULAR INTERNAL DIMENSION IN DIASTOLE: 4.7 CM (ref 3.5–6)
LEFT VENTRICULAR MASS: 150.17 G
LV LATERAL E/E' RATIO: 7.92 M/S
LV SEPTAL E/E' RATIO: 23.75 M/S
MV A" WAVE DURATION": 11.04 MSEC
MV PEAK A VEL: 0.45 M/S
MV PEAK E VEL: 0.95 M/S
NUC REST DIASTOLIC VOLUME INDEX: 74
NUC REST EJECTION FRACTION: 69
NUC REST SYSTOLIC VOLUME INDEX: 23
OHS CV CPX 1 MINUTE RECOVERY HEART RATE: 86 BPM
OHS CV CPX 85 PERCENT MAX PREDICTED HEART RATE MALE: 120
OHS CV CPX MAX PREDICTED HEART RATE: 142
OHS CV CPX PATIENT IS FEMALE: 1
OHS CV CPX PATIENT IS MALE: 0
OHS CV CPX PEAK DIASTOLIC BLOOD PRESSURE: 73 MMHG
OHS CV CPX PEAK HEAR RATE: 92 BPM
OHS CV CPX PEAK RATE PRESSURE PRODUCT: NORMAL
OHS CV CPX PEAK SYSTOLIC BLOOD PRESSURE: 156 MMHG
OHS CV CPX PERCENT MAX PREDICTED HEART RATE ACHIEVED: 65
OHS CV CPX RATE PRESSURE PRODUCT PRESENTING: 6300
PISA TR MAX VEL: 2.78 M/S
PULM VEIN S/D RATIO: 0.65
PV PEAK D VEL: 0.8 M/S
PV PEAK S VEL: 0.52 M/S
RA MAJOR: 5.05 CM
RA PRESSURE: 3 MMHG
RA WIDTH: 3.37 CM
RIGHT VENTRICULAR END-DIASTOLIC DIMENSION: 3.5 CM
RV TISSUE DOPPLER FREE WALL SYSTOLIC VELOCITY 1 (APICAL 4 CHAMBER VIEW): 9.24 CM/S
SINUS: 3.02 CM
STJ: 2.47 CM
SYSTOLIC BLOOD PRESSURE: 140 MMHG
TDI LATERAL: 0.12 M/S
TDI SEPTAL: 0.04 M/S
TDI: 0.08 M/S
TR MAX PG: 31 MMHG
TRICUSPID ANNULAR PLANE SYSTOLIC EXCURSION: 2.04 CM
TV REST PULMONARY ARTERY PRESSURE: 34 MMHG

## 2021-05-07 ENCOUNTER — OFFICE VISIT (OUTPATIENT)
Dept: CARDIOLOGY | Facility: CLINIC | Age: 76
End: 2021-05-07
Payer: MEDICARE

## 2021-05-07 VITALS
HEART RATE: 51 BPM | DIASTOLIC BLOOD PRESSURE: 76 MMHG | WEIGHT: 179.69 LBS | HEIGHT: 64 IN | SYSTOLIC BLOOD PRESSURE: 137 MMHG | BODY MASS INDEX: 30.68 KG/M2

## 2021-05-07 DIAGNOSIS — Z95.1 S/P CABG (CORONARY ARTERY BYPASS GRAFT): ICD-10-CM

## 2021-05-07 DIAGNOSIS — Z79.82 LONG TERM (CURRENT) USE OF ASPIRIN: ICD-10-CM

## 2021-05-07 DIAGNOSIS — E11.65 UNCONTROLLED TYPE 2 DIABETES MELLITUS WITH HYPERGLYCEMIA: ICD-10-CM

## 2021-05-07 DIAGNOSIS — I10 ESSENTIAL (PRIMARY) HYPERTENSION: Chronic | ICD-10-CM

## 2021-05-07 DIAGNOSIS — Z79.899 ON STATIN THERAPY: ICD-10-CM

## 2021-05-07 DIAGNOSIS — I25.10 CORONARY ARTERY DISEASE INVOLVING NATIVE CORONARY ARTERY OF NATIVE HEART WITHOUT ANGINA PECTORIS: Primary | Chronic | ICD-10-CM

## 2021-05-07 PROCEDURE — 99214 PR OFFICE/OUTPT VISIT, EST, LEVL IV, 30-39 MIN: ICD-10-PCS | Mod: S$GLB,,, | Performed by: INTERNAL MEDICINE

## 2021-05-07 PROCEDURE — 1159F MED LIST DOCD IN RCRD: CPT | Mod: S$GLB,,, | Performed by: INTERNAL MEDICINE

## 2021-05-07 PROCEDURE — 3051F HG A1C>EQUAL 7.0%<8.0%: CPT | Mod: S$GLB,,, | Performed by: INTERNAL MEDICINE

## 2021-05-07 PROCEDURE — 1101F PT FALLS ASSESS-DOCD LE1/YR: CPT | Mod: S$GLB,,, | Performed by: INTERNAL MEDICINE

## 2021-05-07 PROCEDURE — 3288F FALL RISK ASSESSMENT DOCD: CPT | Mod: S$GLB,,, | Performed by: INTERNAL MEDICINE

## 2021-05-07 PROCEDURE — 1101F PR PT FALLS ASSESS DOC 0-1 FALLS W/OUT INJ PAST YR: ICD-10-PCS | Mod: S$GLB,,, | Performed by: INTERNAL MEDICINE

## 2021-05-07 PROCEDURE — 3288F PR FALLS RISK ASSESSMENT DOCUMENTED: ICD-10-PCS | Mod: S$GLB,,, | Performed by: INTERNAL MEDICINE

## 2021-05-07 PROCEDURE — 99214 OFFICE O/P EST MOD 30 MIN: CPT | Mod: S$GLB,,, | Performed by: INTERNAL MEDICINE

## 2021-05-07 PROCEDURE — 99999 PR PBB SHADOW E&M-EST. PATIENT-LVL IV: ICD-10-PCS | Mod: PBBFAC,,, | Performed by: INTERNAL MEDICINE

## 2021-05-07 PROCEDURE — 3051F PR MOST RECENT HEMOGLOBIN A1C LEVEL 7.0 - < 8.0%: ICD-10-PCS | Mod: S$GLB,,, | Performed by: INTERNAL MEDICINE

## 2021-05-07 PROCEDURE — 3008F PR BODY MASS INDEX (BMI) DOCUMENTED: ICD-10-PCS | Mod: S$GLB,,, | Performed by: INTERNAL MEDICINE

## 2021-05-07 PROCEDURE — 1126F AMNT PAIN NOTED NONE PRSNT: CPT | Mod: S$GLB,,, | Performed by: INTERNAL MEDICINE

## 2021-05-07 PROCEDURE — 99999 PR PBB SHADOW E&M-EST. PATIENT-LVL IV: CPT | Mod: PBBFAC,,, | Performed by: INTERNAL MEDICINE

## 2021-05-07 PROCEDURE — 3008F BODY MASS INDEX DOCD: CPT | Mod: S$GLB,,, | Performed by: INTERNAL MEDICINE

## 2021-05-07 PROCEDURE — 1126F PR PAIN SEVERITY QUANTIFIED, NO PAIN PRESENT: ICD-10-PCS | Mod: S$GLB,,, | Performed by: INTERNAL MEDICINE

## 2021-05-07 PROCEDURE — 1159F PR MEDICATION LIST DOCUMENTED IN MEDICAL RECORD: ICD-10-PCS | Mod: S$GLB,,, | Performed by: INTERNAL MEDICINE

## 2021-05-07 RX ORDER — SPIRONOLACTONE 25 MG/1
12.5 TABLET ORAL DAILY
Qty: 90 TABLET | Refills: 5 | Status: SHIPPED | OUTPATIENT
Start: 2021-05-07 | End: 2021-07-07 | Stop reason: SDUPTHER

## 2021-05-07 RX ORDER — ASPIRIN 81 MG/1
81 TABLET ORAL DAILY
Qty: 30 TABLET | Refills: 11
Start: 2021-05-07 | End: 2023-03-13

## 2021-05-07 RX ORDER — CARVEDILOL 3.12 MG/1
3.12 TABLET ORAL 2 TIMES DAILY WITH MEALS
Qty: 180 TABLET | Refills: 5 | Status: SHIPPED | OUTPATIENT
Start: 2021-05-07 | End: 2022-01-28

## 2021-05-07 RX ORDER — CLOPIDOGREL BISULFATE 75 MG/1
75 TABLET ORAL DAILY
Qty: 90 TABLET | Refills: 4 | Status: SHIPPED | OUTPATIENT
Start: 2021-05-07 | End: 2022-01-28 | Stop reason: SDUPTHER

## 2021-05-07 RX ORDER — ROSUVASTATIN CALCIUM 20 MG/1
20 TABLET, COATED ORAL DAILY
Qty: 90 TABLET | Refills: 3 | Status: SHIPPED | OUTPATIENT
Start: 2021-05-07 | End: 2022-01-28 | Stop reason: SDUPTHER

## 2021-07-06 ENCOUNTER — TELEPHONE (OUTPATIENT)
Dept: CARDIOLOGY | Facility: CLINIC | Age: 76
End: 2021-07-06

## 2021-07-07 RX ORDER — SPIRONOLACTONE 25 MG/1
12.5 TABLET ORAL DAILY
Qty: 90 TABLET | Refills: 4 | Status: SHIPPED | OUTPATIENT
Start: 2021-07-07 | End: 2022-01-28 | Stop reason: SDUPTHER

## 2021-09-25 ENCOUNTER — IMMUNIZATION (OUTPATIENT)
Dept: FAMILY MEDICINE | Facility: CLINIC | Age: 76
End: 2021-09-25
Payer: MEDICARE

## 2021-09-25 DIAGNOSIS — Z23 NEED FOR VACCINATION: Primary | ICD-10-CM

## 2021-09-25 PROCEDURE — 91300 COVID-19, MRNA, LNP-S, PF, 30 MCG/0.3 ML DOSE VACCINE: CPT | Mod: PBBFAC | Performed by: FAMILY MEDICINE

## 2021-09-25 PROCEDURE — 0003A COVID-19, MRNA, LNP-S, PF, 30 MCG/0.3 ML DOSE VACCINE: CPT | Mod: PBBFAC | Performed by: FAMILY MEDICINE

## 2021-11-24 RX ORDER — LOSARTAN POTASSIUM AND HYDROCHLOROTHIAZIDE 12.5; 5 MG/1; MG/1
TABLET ORAL
Qty: 90 TABLET | Refills: 4 | Status: SHIPPED | OUTPATIENT
Start: 2021-11-24 | End: 2022-01-28 | Stop reason: SDUPTHER

## 2022-01-27 LAB
BASOPHILS # BLD AUTO: 0.1 X10E3/UL (ref 0–0.2)
BASOPHILS NFR BLD AUTO: 1 %
EOSINOPHIL # BLD AUTO: 0.2 X10E3/UL (ref 0–0.4)
EOSINOPHIL NFR BLD AUTO: 2 %
ERYTHROCYTE [DISTWIDTH] IN BLOOD BY AUTOMATED COUNT: 12.5 % (ref 11.7–15.4)
HCT VFR BLD AUTO: 44.4 % (ref 34–46.6)
HGB BLD-MCNC: 14.7 G/DL (ref 11.1–15.9)
IMM GRANULOCYTES # BLD AUTO: 0 X10E3/UL (ref 0–0.1)
IMM GRANULOCYTES NFR BLD AUTO: 0 %
LYMPHOCYTES # BLD AUTO: 2.3 X10E3/UL (ref 0.7–3.1)
LYMPHOCYTES NFR BLD AUTO: 27 %
MCH RBC QN AUTO: 30.9 PG (ref 26.6–33)
MCHC RBC AUTO-ENTMCNC: 33.1 G/DL (ref 31.5–35.7)
MCV RBC AUTO: 93 FL (ref 79–97)
MONOCYTES # BLD AUTO: 0.5 X10E3/UL (ref 0.1–0.9)
MONOCYTES NFR BLD AUTO: 6 %
NEUTROPHILS # BLD AUTO: 5.5 X10E3/UL (ref 1.4–7)
NEUTROPHILS NFR BLD AUTO: 64 %
PLATELET # BLD AUTO: 222 X10E3/UL (ref 150–450)
RBC # BLD AUTO: 4.76 X10E6/UL (ref 3.77–5.28)
TSH SERPL-ACNC: 2.59 UIU/ML (ref 0.45–4.5)
WBC # BLD AUTO: 8.5 X10E3/UL (ref 3.4–10.8)

## 2022-01-28 ENCOUNTER — OFFICE VISIT (OUTPATIENT)
Dept: CARDIOLOGY | Facility: CLINIC | Age: 77
End: 2022-01-28
Payer: MEDICARE

## 2022-01-28 VITALS
BODY MASS INDEX: 29.81 KG/M2 | WEIGHT: 174.63 LBS | HEIGHT: 64 IN | SYSTOLIC BLOOD PRESSURE: 127 MMHG | HEART RATE: 48 BPM | DIASTOLIC BLOOD PRESSURE: 68 MMHG

## 2022-01-28 DIAGNOSIS — I10 ESSENTIAL (PRIMARY) HYPERTENSION: Chronic | ICD-10-CM

## 2022-01-28 DIAGNOSIS — I25.10 CORONARY ARTERY DISEASE INVOLVING NATIVE CORONARY ARTERY OF NATIVE HEART WITHOUT ANGINA PECTORIS: Chronic | ICD-10-CM

## 2022-01-28 DIAGNOSIS — Z78.9 ALCOHOL USE: ICD-10-CM

## 2022-01-28 DIAGNOSIS — Z95.1 S/P CABG (CORONARY ARTERY BYPASS GRAFT): Primary | ICD-10-CM

## 2022-01-28 DIAGNOSIS — Z79.899 ON STATIN THERAPY: ICD-10-CM

## 2022-01-28 PROCEDURE — 1126F AMNT PAIN NOTED NONE PRSNT: CPT | Mod: CPTII,S$GLB,, | Performed by: INTERNAL MEDICINE

## 2022-01-28 PROCEDURE — 1159F PR MEDICATION LIST DOCUMENTED IN MEDICAL RECORD: ICD-10-PCS | Mod: CPTII,S$GLB,, | Performed by: INTERNAL MEDICINE

## 2022-01-28 PROCEDURE — 3078F PR MOST RECENT DIASTOLIC BLOOD PRESSURE < 80 MM HG: ICD-10-PCS | Mod: CPTII,S$GLB,, | Performed by: INTERNAL MEDICINE

## 2022-01-28 PROCEDURE — 1160F PR REVIEW ALL MEDS BY PRESCRIBER/CLIN PHARMACIST DOCUMENTED: ICD-10-PCS | Mod: CPTII,S$GLB,, | Performed by: INTERNAL MEDICINE

## 2022-01-28 PROCEDURE — 99999 PR PBB SHADOW E&M-EST. PATIENT-LVL IV: ICD-10-PCS | Mod: PBBFAC,,, | Performed by: INTERNAL MEDICINE

## 2022-01-28 PROCEDURE — 3008F PR BODY MASS INDEX (BMI) DOCUMENTED: ICD-10-PCS | Mod: CPTII,S$GLB,, | Performed by: INTERNAL MEDICINE

## 2022-01-28 PROCEDURE — 3074F PR MOST RECENT SYSTOLIC BLOOD PRESSURE < 130 MM HG: ICD-10-PCS | Mod: CPTII,S$GLB,, | Performed by: INTERNAL MEDICINE

## 2022-01-28 PROCEDURE — 3008F BODY MASS INDEX DOCD: CPT | Mod: CPTII,S$GLB,, | Performed by: INTERNAL MEDICINE

## 2022-01-28 PROCEDURE — 99214 OFFICE O/P EST MOD 30 MIN: CPT | Mod: S$GLB,,, | Performed by: INTERNAL MEDICINE

## 2022-01-28 PROCEDURE — 1126F PR PAIN SEVERITY QUANTIFIED, NO PAIN PRESENT: ICD-10-PCS | Mod: CPTII,S$GLB,, | Performed by: INTERNAL MEDICINE

## 2022-01-28 PROCEDURE — 99999 PR PBB SHADOW E&M-EST. PATIENT-LVL IV: CPT | Mod: PBBFAC,,, | Performed by: INTERNAL MEDICINE

## 2022-01-28 PROCEDURE — 1159F MED LIST DOCD IN RCRD: CPT | Mod: CPTII,S$GLB,, | Performed by: INTERNAL MEDICINE

## 2022-01-28 PROCEDURE — 99214 PR OFFICE/OUTPT VISIT, EST, LEVL IV, 30-39 MIN: ICD-10-PCS | Mod: S$GLB,,, | Performed by: INTERNAL MEDICINE

## 2022-01-28 PROCEDURE — 3078F DIAST BP <80 MM HG: CPT | Mod: CPTII,S$GLB,, | Performed by: INTERNAL MEDICINE

## 2022-01-28 PROCEDURE — 1160F RVW MEDS BY RX/DR IN RCRD: CPT | Mod: CPTII,S$GLB,, | Performed by: INTERNAL MEDICINE

## 2022-01-28 PROCEDURE — 3074F SYST BP LT 130 MM HG: CPT | Mod: CPTII,S$GLB,, | Performed by: INTERNAL MEDICINE

## 2022-01-28 RX ORDER — ROSUVASTATIN CALCIUM 20 MG/1
20 TABLET, COATED ORAL DAILY
Qty: 90 TABLET | Refills: 3 | Status: SHIPPED | OUTPATIENT
Start: 2022-01-28 | End: 2023-02-01

## 2022-01-28 RX ORDER — SPIRONOLACTONE 25 MG/1
12.5 TABLET ORAL DAILY
Qty: 90 TABLET | Refills: 4 | Status: SHIPPED | OUTPATIENT
Start: 2022-01-28 | End: 2022-08-01

## 2022-01-28 RX ORDER — CLOPIDOGREL BISULFATE 75 MG/1
75 TABLET ORAL
Qty: 90 TABLET | Refills: 5 | Status: SHIPPED | OUTPATIENT
Start: 2022-01-28 | End: 2023-02-01

## 2022-01-28 RX ORDER — LOSARTAN POTASSIUM AND HYDROCHLOROTHIAZIDE 12.5; 5 MG/1; MG/1
1 TABLET ORAL DAILY
Qty: 90 TABLET | Refills: 4 | Status: SHIPPED | OUTPATIENT
Start: 2022-01-28 | End: 2023-02-01

## 2022-01-28 NOTE — PROGRESS NOTES
Subjective:    Patient ID:  Roseanne Gonzalez is a 76 y.o. female who presents for follow-up of No chief complaint on file.      HPI  Here for follow up of CABG x4 (2007), DLP, HTN.  Had classic exertional angina at the time. Patient is exercising regularly with out symptoms.  Patients states is doing well no chest pain, SOB or change in exertional tolerence.       Review of Systems   Constitutional: Negative for malaise/fatigue.   Eyes: Negative for blurred vision.   Cardiovascular: Negative for chest pain, claudication, cyanosis, dyspnea on exertion, irregular heartbeat, leg swelling, near-syncope, orthopnea, palpitations, paroxysmal nocturnal dyspnea and syncope.   Respiratory: Negative for cough and shortness of breath.    Hematologic/Lymphatic: Does not bruise/bleed easily.   Musculoskeletal: Negative for back pain, falls, joint pain, muscle cramps, muscle weakness and myalgias.   Gastrointestinal: Negative for abdominal pain, change in bowel habit, nausea and vomiting.   Genitourinary: Negative for urgency.   Neurological: Negative for dizziness, focal weakness and light-headedness.       Past Medical History:   Diagnosis Date    Allergic urticaria     Benign hypertensive heart disease without heart failure     Benign hypertensive heart disease without heart failure     Cervicalgia     Coronary artery disease involving native coronary artery of native heart without angina pectoris 6/17/2015 2007 [facility]    Coronary atherosclerosis of native coronary artery     Coronary atherosclerosis of native coronary artery     Coronary atherosclerosis of native coronary artery     Diabetes mellitus, type 2     Dizziness and giddiness     Encounter for long-term (current) use of insulin     Esophageal reflux     Essential (primary) hypertension 6/17/2015    Fibrocystic breast     Headache(784.0)     Hyperthyroidism     Insomnia, unspecified     Osteopenia of multiple sites 5/14/2017    Other and  unspecified hyperlipidemia     Pain in joint, ankle and foot     S/P CABG (coronary artery bypass graft) 5/22/2019    CABGX4 2007 Dr Collins    Spondylosis of unspecified site without mention of myelopathy     Type II or unspecified type diabetes mellitus with peripheral circulatory disorders, not stated as uncontrolled(250.70)     Type II or unspecified type diabetes mellitus with peripheral circulatory disorders, not stated as uncontrolled(250.70)     Type II or unspecified type diabetes mellitus without mention of complication, not stated as uncontrolled     Uncontrolled type 2 diabetes mellitus without complication, without long-term current use of insulin 3/9/2017    Unspecified essential hypertension     Vertigo 02/13/2019     There are no hospital problems to display for this patient.       Objective:     Vitals:    01/28/22 1451   BP: 127/68   Pulse: (!) 48        Physical Exam  Vitals and nursing note reviewed.   Constitutional:       Appearance: She is well-developed.   HENT:      Head: Normocephalic.   Eyes:      Conjunctiva/sclera: Conjunctivae normal.   Neck:      Vascular: No JVD.   Cardiovascular:      Rate and Rhythm: Normal rate and regular rhythm.      Pulses: Intact distal pulses.           Carotid pulses are 2+ on the right side and 2+ on the left side.       Radial pulses are 2+ on the right side and 2+ on the left side.        Dorsalis pedis pulses are 2+ on the right side and 2+ on the left side.        Posterior tibial pulses are 2+ on the right side and 2+ on the left side.      Heart sounds: Normal heart sounds.   Pulmonary:      Effort: Pulmonary effort is normal.      Breath sounds: Normal breath sounds.   Abdominal:      General: Bowel sounds are normal.      Palpations: Abdomen is soft.   Musculoskeletal:         General: No tenderness.      Cervical back: Normal range of motion and neck supple.   Skin:     General: Skin is warm and dry.      Nails: There is no clubbing.    Neurological:      Mental Status: She is alert and oriented to person, place, and time.      Gait: Gait normal.   Psychiatric:         Speech: Speech normal.         Behavior: Behavior normal.         Thought Content: Thought content normal.               ..    Chemistry        Component Value Date/Time     11/12/2021 0929    K 4.6 11/12/2021 0929     11/12/2021 0929    CO2 24 11/12/2021 0929    BUN 13 11/12/2021 0929    CREATININE 0.83 11/12/2021 0929    GLU 98 11/12/2021 0929        Component Value Date/Time    CALCIUM 9.5 11/12/2021 0929    ALKPHOS 41 (L) 04/30/2021 0758    AST 26 11/12/2021 0929    ALT 16 11/12/2021 0929    BILITOT 0.5 11/12/2021 0929    ESTGFRAFRICA >60.0 04/30/2021 0758    EGFRNONAA 70 11/12/2021 0929            ..  Lab Results   Component Value Date    CHOL 155 11/12/2021    CHOL 141 10/18/2021    CHOL 169 04/30/2021     Lab Results   Component Value Date    HDL 53 11/12/2021    HDL 56 10/18/2021    HDL 48 04/30/2021     Lab Results   Component Value Date    LDLCALC 86 11/12/2021    LDLCALC 69 10/18/2021    LDLCALC 98.4 04/30/2021     Lab Results   Component Value Date    TRIG 86 11/12/2021    TRIG 80 10/18/2021    TRIG 113 04/30/2021     Lab Results   Component Value Date    CHOLHDL 28.4 04/30/2021    CHOLHDL 2.9 06/11/2020    CHOLHDL 32.1 10/16/2019     ..  Lab Results   Component Value Date    WBC 8.5 01/26/2022    HGB 14.7 01/26/2022    HCT 44.4 01/26/2022    MCV 93 01/26/2022     01/26/2022       Test(s) Reviewed  I have reviewed the following in detail:  [] Stress test   [] Angiography   [x] Echocardiogram   [x] Labs   [] Other:       Assessment:         ICD-10-CM ICD-9-CM   1. S/P CABG (coronary artery bypass graft)  Z95.1 V45.81   2. Coronary artery disease involving native coronary artery of native heart without angina pectoris  I25.10 414.01   3. Essential (primary) hypertension  I10 401.9   4. On statin therapy  Z79.899 V58.69   5. Alcohol use  Z72.89 V49.89      Active Problem List with Overview Notes    Diagnosis Date Noted    Uncontrolled type 2 diabetes mellitus with hyperglycemia 10/19/2020    Class 1 obesity due to excess calories with serious comorbidity and body mass index (BMI) of 31.0 to 31.9 in adult 10/19/2020    On statin therapy 10/19/2020    Long term (current) use of aspirin 10/19/2020    Current use of proton pump inhibitor 10/19/2020    Hyperthyroidism 04/16/2020    Type 2 diabetes mellitus with hypercholesterolemia 04/16/2020    Rotator cuff syndrome, left 01/14/2020    S/P CABG (coronary artery bypass graft) 05/22/2019     CABGX4 2007 Dr Collins      Alcohol use 09/13/2017    Osteopenia of multiple sites 05/14/2017    Actinic keratosis 03/09/2017    Chronic fatigue 03/09/2017    Essential (primary) hypertension 06/17/2015    Coronary artery disease involving native coronary artery of native heart without angina pectoris 06/17/2015 2007 [facility]      Esophageal reflux 06/17/2015    Spondylosis of unspecified site without mention of myelopathy 06/17/2015     Dx updated per 2019 IMO Load      Insomnia, unspecified 06/17/2015    Mammogram abnormal 06/17/2015        Plan:           Return to clinic 12 months   Low level/low impact aerobic exercise 5x's/wk. Heart healthy diet and risk factor modification.    See labs and med orders.  Lipids cmp in 6 mo f/u  PCP if LDL > 70 add zetia          Portions of this note may have been created with voice recognition software.  Grammatical, syntax and spelling errors may be inevitable.

## 2022-06-09 ENCOUNTER — TELEPHONE (OUTPATIENT)
Dept: ENDOCRINOLOGY | Facility: CLINIC | Age: 77
End: 2022-06-09
Payer: MEDICARE

## 2022-06-09 NOTE — TELEPHONE ENCOUNTER
----- Message from Lesia Rodriguez sent at 6/9/2022  1:16 PM CDT -----  Type:  Patient Call Back    Who Called: Pt     What is the reqeust in detail: Pt is requesting a call back to schedule an appt next appt wasn't until august and pt states that she trying to contact the office for 3 day. Please Advise     Can the clinic reply by [facility]?    Best Call Back Number:[phone]

## 2022-06-09 NOTE — TELEPHONE ENCOUNTER
Spoke with pt and gave soonest appt for 6/22 in [place] with VEL Gonzalez  Pt will bring Stephane 2 device for upload at new pt appt

## 2022-06-22 ENCOUNTER — TELEPHONE (OUTPATIENT)
Dept: ENDOCRINOLOGY | Facility: CLINIC | Age: 77
End: 2022-06-22

## 2022-06-22 ENCOUNTER — OFFICE VISIT (OUTPATIENT)
Dept: ENDOCRINOLOGY | Facility: CLINIC | Age: 77
End: 2022-06-22
Payer: MEDICARE

## 2022-06-22 ENCOUNTER — TELEPHONE (OUTPATIENT)
Dept: PHARMACY | Facility: AMBULARY SURGERY CENTER | Age: 77
End: 2022-06-22
Payer: MEDICARE

## 2022-06-22 VITALS
BODY MASS INDEX: 28.71 KG/M2 | DIASTOLIC BLOOD PRESSURE: 70 MMHG | HEART RATE: 51 BPM | SYSTOLIC BLOOD PRESSURE: 108 MMHG | HEIGHT: 64 IN | WEIGHT: 168.19 LBS

## 2022-06-22 DIAGNOSIS — Z79.4 TYPE 2 DIABETES MELLITUS WITH OTHER SPECIFIED COMPLICATION, WITH LONG-TERM CURRENT USE OF INSULIN: Primary | ICD-10-CM

## 2022-06-22 DIAGNOSIS — I10 PRIMARY HYPERTENSION: ICD-10-CM

## 2022-06-22 DIAGNOSIS — E11.69 TYPE 2 DIABETES MELLITUS WITH OTHER SPECIFIED COMPLICATION, WITH LONG-TERM CURRENT USE OF INSULIN: Primary | ICD-10-CM

## 2022-06-22 DIAGNOSIS — E11.59 TYPE 2 DIABETES MELLITUS WITH OTHER CIRCULATORY COMPLICATION, WITHOUT LONG-TERM CURRENT USE OF INSULIN: ICD-10-CM

## 2022-06-22 DIAGNOSIS — I25.10 CORONARY ARTERY DISEASE INVOLVING NATIVE CORONARY ARTERY OF NATIVE HEART WITHOUT ANGINA PECTORIS: ICD-10-CM

## 2022-06-22 DIAGNOSIS — E05.90 HYPERTHYROIDISM: ICD-10-CM

## 2022-06-22 DIAGNOSIS — E78.5 HYPERLIPIDEMIA, UNSPECIFIED HYPERLIPIDEMIA TYPE: ICD-10-CM

## 2022-06-22 PROCEDURE — 3078F PR MOST RECENT DIASTOLIC BLOOD PRESSURE < 80 MM HG: ICD-10-PCS | Mod: CPTII,S$GLB,, | Performed by: NURSE PRACTITIONER

## 2022-06-22 PROCEDURE — 1101F PR PT FALLS ASSESS DOC 0-1 FALLS W/OUT INJ PAST YR: ICD-10-PCS | Mod: CPTII,S$GLB,, | Performed by: NURSE PRACTITIONER

## 2022-06-22 PROCEDURE — 3288F FALL RISK ASSESSMENT DOCD: CPT | Mod: CPTII,S$GLB,, | Performed by: NURSE PRACTITIONER

## 2022-06-22 PROCEDURE — 3066F NEPHROPATHY DOC TX: CPT | Mod: CPTII,S$GLB,, | Performed by: NURSE PRACTITIONER

## 2022-06-22 PROCEDURE — 99999 PR PBB SHADOW E&M-EST. PATIENT-LVL V: CPT | Mod: PBBFAC,,, | Performed by: NURSE PRACTITIONER

## 2022-06-22 PROCEDURE — 3074F SYST BP LT 130 MM HG: CPT | Mod: CPTII,S$GLB,, | Performed by: NURSE PRACTITIONER

## 2022-06-22 PROCEDURE — 3074F PR MOST RECENT SYSTOLIC BLOOD PRESSURE < 130 MM HG: ICD-10-PCS | Mod: CPTII,S$GLB,, | Performed by: NURSE PRACTITIONER

## 2022-06-22 PROCEDURE — 1159F MED LIST DOCD IN RCRD: CPT | Mod: CPTII,S$GLB,, | Performed by: NURSE PRACTITIONER

## 2022-06-22 PROCEDURE — 3288F PR FALLS RISK ASSESSMENT DOCUMENTED: ICD-10-PCS | Mod: CPTII,S$GLB,, | Performed by: NURSE PRACTITIONER

## 2022-06-22 PROCEDURE — 99204 PR OFFICE/OUTPT VISIT, NEW, LEVL IV, 45-59 MIN: ICD-10-PCS | Mod: S$GLB,,, | Performed by: NURSE PRACTITIONER

## 2022-06-22 PROCEDURE — 3078F DIAST BP <80 MM HG: CPT | Mod: CPTII,S$GLB,, | Performed by: NURSE PRACTITIONER

## 2022-06-22 PROCEDURE — 1126F AMNT PAIN NOTED NONE PRSNT: CPT | Mod: CPTII,S$GLB,, | Performed by: NURSE PRACTITIONER

## 2022-06-22 PROCEDURE — 1101F PT FALLS ASSESS-DOCD LE1/YR: CPT | Mod: CPTII,S$GLB,, | Performed by: NURSE PRACTITIONER

## 2022-06-22 PROCEDURE — 3061F NEG MICROALBUMINURIA REV: CPT | Mod: CPTII,S$GLB,, | Performed by: NURSE PRACTITIONER

## 2022-06-22 PROCEDURE — 3066F PR DOCUMENTATION OF TREATMENT FOR NEPHROPATHY: ICD-10-PCS | Mod: CPTII,S$GLB,, | Performed by: NURSE PRACTITIONER

## 2022-06-22 PROCEDURE — 99204 OFFICE O/P NEW MOD 45 MIN: CPT | Mod: S$GLB,,, | Performed by: NURSE PRACTITIONER

## 2022-06-22 PROCEDURE — 1160F PR REVIEW ALL MEDS BY PRESCRIBER/CLIN PHARMACIST DOCUMENTED: ICD-10-PCS | Mod: CPTII,S$GLB,, | Performed by: NURSE PRACTITIONER

## 2022-06-22 PROCEDURE — 1159F PR MEDICATION LIST DOCUMENTED IN MEDICAL RECORD: ICD-10-PCS | Mod: CPTII,S$GLB,, | Performed by: NURSE PRACTITIONER

## 2022-06-22 PROCEDURE — 1160F RVW MEDS BY RX/DR IN RCRD: CPT | Mod: CPTII,S$GLB,, | Performed by: NURSE PRACTITIONER

## 2022-06-22 PROCEDURE — 3052F HG A1C>EQUAL 8.0%<EQUAL 9.0%: CPT | Mod: CPTII,S$GLB,, | Performed by: NURSE PRACTITIONER

## 2022-06-22 PROCEDURE — 1126F PR PAIN SEVERITY QUANTIFIED, NO PAIN PRESENT: ICD-10-PCS | Mod: CPTII,S$GLB,, | Performed by: NURSE PRACTITIONER

## 2022-06-22 PROCEDURE — 3052F PR MOST RECENT HEMOGLOBIN A1C LEVEL 8.0 - < 9.0%: ICD-10-PCS | Mod: CPTII,S$GLB,, | Performed by: NURSE PRACTITIONER

## 2022-06-22 PROCEDURE — 3008F BODY MASS INDEX DOCD: CPT | Mod: CPTII,S$GLB,, | Performed by: NURSE PRACTITIONER

## 2022-06-22 PROCEDURE — 99999 PR PBB SHADOW E&M-EST. PATIENT-LVL V: ICD-10-PCS | Mod: PBBFAC,,, | Performed by: NURSE PRACTITIONER

## 2022-06-22 PROCEDURE — 3008F PR BODY MASS INDEX (BMI) DOCUMENTED: ICD-10-PCS | Mod: CPTII,S$GLB,, | Performed by: NURSE PRACTITIONER

## 2022-06-22 PROCEDURE — 3061F PR NEG MICROALBUMINURIA RESULT DOCUMENTED/REVIEW: ICD-10-PCS | Mod: CPTII,S$GLB,, | Performed by: NURSE PRACTITIONER

## 2022-06-22 RX ORDER — INSULIN GLARGINE 300 U/ML
36 INJECTION, SOLUTION SUBCUTANEOUS DAILY
Qty: 2 PEN | Refills: 11
Start: 2022-06-22 | End: 2023-06-20

## 2022-06-22 RX ORDER — DULAGLUTIDE 0.75 MG/.5ML
0.75 INJECTION, SOLUTION SUBCUTANEOUS
Qty: 4 PEN | Refills: 6 | Status: SHIPPED | OUTPATIENT
Start: 2022-06-22 | End: 2023-06-05 | Stop reason: SDUPTHER

## 2022-06-22 NOTE — PROGRESS NOTES
CC: Ms. Roseanne Gonzalez arrives today for management of Type 2 DM and review of chronic medical conditions, as listed in the Visit Diagnosis section of this encounter.       HPI: Ms. Roseanne Gonzalez was diagnosed with Type 2 DM in 2007. She was diagnosed based on lab work following CABG. Initial treatment consisted of metformin. Insulin added about 10 years after diagnosis. Denies FH of DM. Denies hospitalizations due to DM.   She has diagnosis of CAD with past CABG. Follows with cardiology.   She also has hyperthyroidism and takes methimazole.     This is her first time being seen in endocrine.     She states that since recent lab work (A1c 8.5%), she had stopped baking sweets and has reduced carb portions. She has lost 4# since making this change.     Had been wearing Freestyle Stephane but stopped because it wouldn't stay adhered to skin. Plans to start using Skin Tac and SIM Patch. She reports that she gets this free through [employer] through some type of assistance program?     BG readings are checked 2x/day and readings range from 71 to 211 on limited log. Most FBG are reasonable.     Hypoglycemia: Yes - reports one episode recently around 7PM. She found herself on the floor. Had fallen and knocked over chair. Woke up with her dog next to her. By the time she checked BG, it was 71.   Hypoglycemic Symptoms: fatigue  Hypoglycemia Treatment: 1/2 candy bar, PB    Missing Insulin/PO medication doses: No    Exercise: Yes - goes to gym class for 1 hour 3 days/week ([employer])    Dietary Habits: Eats 3 meals/day. Rare snacking.     Last DM education appointment: 6/2021      CURRENT DIABETIC MEDS: Toujeo Max 40 units QHS  Vial or pen: pen  Glucometer type:     Previous DM treatments:  Metformin - diarrhea   Januvia - rash?    Last Eye Exam: 3/8/2022, no DR. Dr. Laguna  Last Podiatry Exam: n/a    REVIEW OF SYSTEMS  Constitutional: no c/o fatigue, weakness. + 4# weight loss in 1 month.   Eyes: denies visual  "disturbances.  Cardiac: no palpitations or chest pain.  Respiratory: no cough or dyspnea.  GI: no c/o abdominal pain or nausea. Denies h/o pancreatitis.   : denies urinary frequency, burning, discharge, frequent UTIs  Skin: no lesions or rashes.  Neuro: no numbness, tingling, or parasthesias.  Endocrine: denies polyphagia, polydipsia, polyuria      Personally reviewed Past Medical, Surgical, Social History.    Vital Signs  /70   Pulse (!) 51   Ht 5' 4" (1.626 m)   Wt 76.3 kg (168 lb 3.4 oz)   BMI 28.87 kg/m²     Personally reviewed the below labs:    Hemoglobin A1C   Date Value Ref Range Status   10/07/2020 8.0 (H) 4.8 - 5.6 % Final     Comment:              Prediabetes: 5.7 - 6.4           Diabetes: >6.4           Glycemic control for adults with diabetes: <7.0     06/11/2020 7.4 (H) <5.7 % of total Hgb Final     Comment:     For someone without known diabetes, a hemoglobin A1c  value of 6.5% or greater indicates that they may have   diabetes and this should be confirmed with a follow-up   test.     For someone with known diabetes, a value <7% indicates   that their diabetes is well controlled and a value   greater than or equal to 7% indicates suboptimal   control. A1c targets should be individualized based on   duration of diabetes, age, comorbid conditions, and   other considerations.     Currently, no consensus exists regarding use of  hemoglobin A1c for diagnosis of diabetes for children.         10/16/2019 7.4 (H) 4.0 - 5.6 % Final     Comment:     ADA Screening Guidelines:  5.7-6.4%  Consistent with prediabetes  >or=6.5%  Consistent with diabetes  High levels of fetal hemoglobin interfere with the HbA1C  assay. Heterozygous hemoglobin variants (HbS, HgC, etc)do  not significantly interfere with this assay.   However, presence of multiple variants may affect accuracy.       Hemoglobin A1c   Date Value Ref Range Status   06/07/2022 8.5 (H) 4.8 - 5.6 % Final     Comment:              Prediabetes: " 5.7 - 6.4           Diabetes: >6.4           Glycemic control for adults with diabetes: <7.0     02/17/2022 8.4 (H) 4.8 - 5.6 % Final     Comment:              Prediabetes: 5.7 - 6.4           Diabetes: >6.4           Glycemic control for adults with diabetes: <7.0     11/12/2021 7.9 (H) 4.8 - 5.6 % Final     Comment:              Prediabetes: 5.7 - 6.4           Diabetes: >6.4           Glycemic control for adults with diabetes: <7.0         Chemistry        Component Value Date/Time     06/07/2022 0800    K 4.4 06/07/2022 0800     06/07/2022 0800    CO2 25 06/07/2022 0800    BUN 15 06/07/2022 0800    CREATININE 0.86 06/07/2022 0800     (H) 06/07/2022 0800        Component Value Date/Time    CALCIUM 9.4 06/07/2022 0800    ALKPHOS 41 (L) 04/30/2021 0758    AST 24 06/07/2022 0800    ALT 20 06/07/2022 0800    BILITOT 0.6 06/07/2022 0800    ESTGFRAFRICA >60.0 04/30/2021 0758    EGFRNONAA 69 02/17/2022 0829          Lab Results   Component Value Date    CHOL 173 06/07/2022    CHOL 149 02/17/2022    CHOL 155 11/12/2021     Lab Results   Component Value Date    HDL 54 06/07/2022    HDL 59 02/17/2022    HDL 53 11/12/2021     Lab Results   Component Value Date    LDLCALC 100 (H) 06/07/2022    LDLCALC 74 02/17/2022    LDLCALC 86 11/12/2021     Lab Results   Component Value Date    TRIG 106 06/07/2022    TRIG 82 02/17/2022    TRIG 86 11/12/2021     Lab Results   Component Value Date    CHOLHDL 28.4 04/30/2021    CHOLHDL 2.9 06/11/2020    CHOLHDL 32.1 10/16/2019       Lab Results   Component Value Date    MICALBCREAT 5 06/11/2020     Lab Results   Component Value Date    TSH 2.900 06/07/2022       CrCl cannot be calculated (Patient's most recent lab result is older than the maximum 7 days allowed.).    Vit D, 25-Hydroxy   Date Value Ref Range Status   11/06/2017 41 30 - 96 ng/mL Final     Comment:     Vitamin D deficiency.........<10 ng/mL                              Vitamin D insufficiency......10-29 ng/mL        Vitamin D sufficiency........> or equal to 30 ng/mL  Vitamin D toxicity............>100 ng/mL           PHYSICAL EXAMINATION  Constitutional: Appears well, no distress  Neck: Supple, trachea midline.  Respiratory: CTA, even and unlabored.  Cardiovascular: RRR, no murmurs, no carotid bruits.   GI: bowel sounds active, no hernia noted  Skin: warm and dry; no visible wounds  Neuro: oriented to person, place, time  Feet: appropriate footwear.  Protective Sensation (w/ 10 gram monofilament):  Right: Intact  Left: Intact    Visual Inspection:  Normal -  Right  and Onychomycosis -  Left    Pedal Pulses:   Right: Present  Left: Present    Posterior tibialis:   Right:Present  Left: Present         Goals      HEMOGLOBIN A1C < 7             Assessment/Plan  1. Type 2 diabetes mellitus with other specified complication, with long-term current use of insulin  -- Uncontrolled. She has made positive dietary changes but continues with some post-prandial hyperglycemia. Would benefit from weekly GLP-1RA. Will loop in patient assistance rep, as I anticipate this may be costly for patient.   -- start Trulicity 0.75 mg weekly. Discussed medication's mechanism of action, side effects, and contraindications.   -- decrease Toujeo to 36 units.   -- resume Freestyle Stephane when able and use Skin Tac/SIM patch  -- Explained that meals should be balanced with protein, carb, and non-starchy veg. Reviewed portion size recommendations.     -- Discussed diagnosis of DM, A1c goals, progression of disease, long term complications and tx options.    -- Reviewed hypoglycemia management: treat with 4 oz of juice, 4 oz regular soda, or 4 glucose tablets. Monitor and repeat treatment every 15 minutes until BG is >70 Then have a snack, which includes 15 grams of complex carbohydrates and protein.   Advised patient to check BG before activities, such as driving or exercise.    -- takes statin, aspirin, ARB   2. Coronary artery disease involving  native coronary artery of native heart without angina pectoris  -- follows with cardiology  -- avoid hypoglycemia   3. Primary hypertension  -- controlled  -- on ARB   4. Hyperthyroidism  -- controlled  -- continue methimazole   5. Hyperlipidemia, unspecified hyperlipidemia type  -- suboptimal  -- may consider increasing Crestor in the future. Will evaluate effects of diet change first.        FOLLOW UP  Follow up in about 3 months (around 9/22/2022).   Patient instructed to bring BG logs to each follow up   Patient encouraged to call for any BG/medication issues, concerns, or questions.    [facility] LABS:    Orders Placed This Encounter   Procedures    Microalbumin/Creatinine Ratio, Urine    Hemoglobin A1C    Comprehensive Metabolic Panel

## 2022-06-22 NOTE — TELEPHONE ENCOUNTER
Please contact pt regarding Trulicity Patient Assistance. This will be a new med for her but she states that meds at this level tier are too expensive for her. I sent to [employer] anyway in case she needs to get a price for you. Thank you!

## 2022-08-01 RX ORDER — SPIRONOLACTONE 25 MG/1
TABLET ORAL
Qty: 90 TABLET | Refills: 4 | Status: SHIPPED | OUTPATIENT
Start: 2022-08-01 | End: 2022-09-26 | Stop reason: SDUPTHER

## 2022-08-19 ENCOUNTER — TELEPHONE (OUTPATIENT)
Dept: PHARMACY | Facility: CLINIC | Age: 77
End: 2022-08-19
Payer: MEDICARE

## 2022-08-19 NOTE — TELEPHONE ENCOUNTER
Called patient and left a message to contact Pharmacy Patient Assistance and I will also mail a letter in reference to needing assistance with medication

## 2022-09-13 LAB
ALBUMIN SERPL-MCNC: 4.2 G/DL (ref 3.7–4.7)
ALBUMIN/CREAT UR: 7 MG/G CREAT (ref 0–29)
ALBUMIN/GLOB SERPL: 2.8 {RATIO} (ref 1.2–2.2)
ALP SERPL-CCNC: 37 IU/L (ref 44–121)
ALT SERPL-CCNC: 19 IU/L (ref 0–32)
AST SERPL-CCNC: 25 IU/L (ref 0–40)
BILIRUB SERPL-MCNC: 0.5 MG/DL (ref 0–1.2)
BUN SERPL-MCNC: 18 MG/DL (ref 8–27)
BUN/CREAT SERPL: 20 (ref 12–28)
CALCIUM SERPL-MCNC: 9.3 MG/DL (ref 8.7–10.3)
CHLORIDE SERPL-SCNC: 103 MMOL/L (ref 96–106)
CO2 SERPL-SCNC: 24 MMOL/L (ref 20–29)
CREAT SERPL-MCNC: 0.9 MG/DL (ref 0.57–1)
CREAT UR-MCNC: 154.3 MG/DL
EST. GFR  (NO RACE VARIABLE): 67 ML/MIN/1.73
GLOBULIN SER CALC-MCNC: 1.5 G/DL (ref 1.5–4.5)
GLUCOSE SERPL-MCNC: 78 MG/DL (ref 65–99)
HBA1C MFR BLD: 6.8 % (ref 4.8–5.6)
MICROALBUMIN UR-MCNC: 10.1 UG/ML
POTASSIUM SERPL-SCNC: 4.8 MMOL/L (ref 3.5–5.2)
PROT SERPL-MCNC: 5.7 G/DL (ref 6–8.5)
SODIUM SERPL-SCNC: 140 MMOL/L (ref 134–144)

## 2022-09-21 ENCOUNTER — TELEPHONE (OUTPATIENT)
Dept: ENDOCRINOLOGY | Facility: CLINIC | Age: 77
End: 2022-09-21
Payer: MEDICARE

## 2022-09-21 NOTE — TELEPHONE ENCOUNTER
----- Message from David Mcneill sent at 9/21/2022  2:55 PM CDT -----  Regarding: appointment  Contact: patient  Patient want to know if office can fit her in sometime in December for follow up, please call back at [phone] (home)     Case number [id number]

## 2022-11-19 PROBLEM — Z79.82 LONG TERM (CURRENT) USE OF ASPIRIN: Chronic | Status: ACTIVE | Noted: 2020-10-19

## 2022-11-19 PROBLEM — E66.811 CLASS 1 OBESITY DUE TO EXCESS CALORIES WITH SERIOUS COMORBIDITY AND BODY MASS INDEX (BMI) OF 31.0 TO 31.9 IN ADULT: Chronic | Status: ACTIVE | Noted: 2020-10-19

## 2022-11-19 PROBLEM — Z78.9 ALCOHOL USE: Chronic | Status: ACTIVE | Noted: 2017-09-13

## 2022-11-19 PROBLEM — F10.90 ALCOHOL USE: Chronic | Status: ACTIVE | Noted: 2017-09-13

## 2022-11-19 PROBLEM — E66.09 CLASS 1 OBESITY DUE TO EXCESS CALORIES WITH SERIOUS COMORBIDITY AND BODY MASS INDEX (BMI) OF 31.0 TO 31.9 IN ADULT: Chronic | Status: ACTIVE | Noted: 2020-10-19

## 2022-11-19 PROBLEM — M85.89 OSTEOPENIA OF MULTIPLE SITES: Chronic | Status: ACTIVE | Noted: 2017-05-14

## 2022-11-19 PROBLEM — Z79.899 CURRENT USE OF PROTON PUMP INHIBITOR: Chronic | Status: ACTIVE | Noted: 2020-10-19

## 2022-12-20 ENCOUNTER — OFFICE VISIT (OUTPATIENT)
Dept: ENDOCRINOLOGY | Facility: CLINIC | Age: 77
End: 2022-12-20
Payer: MEDICARE

## 2022-12-20 VITALS
HEART RATE: 60 BPM | BODY MASS INDEX: 27.92 KG/M2 | HEIGHT: 64 IN | DIASTOLIC BLOOD PRESSURE: 70 MMHG | WEIGHT: 163.56 LBS | SYSTOLIC BLOOD PRESSURE: 120 MMHG

## 2022-12-20 DIAGNOSIS — I25.10 CORONARY ARTERY DISEASE INVOLVING NATIVE CORONARY ARTERY OF NATIVE HEART WITHOUT ANGINA PECTORIS: ICD-10-CM

## 2022-12-20 DIAGNOSIS — Z79.4 TYPE 2 DIABETES MELLITUS WITH OTHER SPECIFIED COMPLICATION, WITH LONG-TERM CURRENT USE OF INSULIN: Primary | ICD-10-CM

## 2022-12-20 DIAGNOSIS — E78.5 HYPERLIPIDEMIA, UNSPECIFIED HYPERLIPIDEMIA TYPE: ICD-10-CM

## 2022-12-20 DIAGNOSIS — I10 PRIMARY HYPERTENSION: ICD-10-CM

## 2022-12-20 DIAGNOSIS — E05.90 HYPERTHYROIDISM: ICD-10-CM

## 2022-12-20 DIAGNOSIS — E11.69 TYPE 2 DIABETES MELLITUS WITH OTHER SPECIFIED COMPLICATION, WITH LONG-TERM CURRENT USE OF INSULIN: Primary | ICD-10-CM

## 2022-12-20 PROCEDURE — 3044F PR MOST RECENT HEMOGLOBIN A1C LEVEL <7.0%: ICD-10-PCS | Mod: CPTII,S$GLB,, | Performed by: NURSE PRACTITIONER

## 2022-12-20 PROCEDURE — 3078F PR MOST RECENT DIASTOLIC BLOOD PRESSURE < 80 MM HG: ICD-10-PCS | Mod: CPTII,S$GLB,, | Performed by: NURSE PRACTITIONER

## 2022-12-20 PROCEDURE — 3288F FALL RISK ASSESSMENT DOCD: CPT | Mod: CPTII,S$GLB,, | Performed by: NURSE PRACTITIONER

## 2022-12-20 PROCEDURE — 3074F PR MOST RECENT SYSTOLIC BLOOD PRESSURE < 130 MM HG: ICD-10-PCS | Mod: CPTII,S$GLB,, | Performed by: NURSE PRACTITIONER

## 2022-12-20 PROCEDURE — 1160F RVW MEDS BY RX/DR IN RCRD: CPT | Mod: CPTII,S$GLB,, | Performed by: NURSE PRACTITIONER

## 2022-12-20 PROCEDURE — 99999 PR PBB SHADOW E&M-EST. PATIENT-LVL V: CPT | Mod: PBBFAC,,, | Performed by: NURSE PRACTITIONER

## 2022-12-20 PROCEDURE — 1160F PR REVIEW ALL MEDS BY PRESCRIBER/CLIN PHARMACIST DOCUMENTED: ICD-10-PCS | Mod: CPTII,S$GLB,, | Performed by: NURSE PRACTITIONER

## 2022-12-20 PROCEDURE — 3044F HG A1C LEVEL LT 7.0%: CPT | Mod: CPTII,S$GLB,, | Performed by: NURSE PRACTITIONER

## 2022-12-20 PROCEDURE — 1159F PR MEDICATION LIST DOCUMENTED IN MEDICAL RECORD: ICD-10-PCS | Mod: CPTII,S$GLB,, | Performed by: NURSE PRACTITIONER

## 2022-12-20 PROCEDURE — 1101F PR PT FALLS ASSESS DOC 0-1 FALLS W/OUT INJ PAST YR: ICD-10-PCS | Mod: CPTII,S$GLB,, | Performed by: NURSE PRACTITIONER

## 2022-12-20 PROCEDURE — 99214 OFFICE O/P EST MOD 30 MIN: CPT | Mod: S$GLB,,, | Performed by: NURSE PRACTITIONER

## 2022-12-20 PROCEDURE — 1101F PT FALLS ASSESS-DOCD LE1/YR: CPT | Mod: CPTII,S$GLB,, | Performed by: NURSE PRACTITIONER

## 2022-12-20 PROCEDURE — 3288F PR FALLS RISK ASSESSMENT DOCUMENTED: ICD-10-PCS | Mod: CPTII,S$GLB,, | Performed by: NURSE PRACTITIONER

## 2022-12-20 PROCEDURE — 99999 PR PBB SHADOW E&M-EST. PATIENT-LVL V: ICD-10-PCS | Mod: PBBFAC,,, | Performed by: NURSE PRACTITIONER

## 2022-12-20 PROCEDURE — 3066F PR DOCUMENTATION OF TREATMENT FOR NEPHROPATHY: ICD-10-PCS | Mod: CPTII,S$GLB,, | Performed by: NURSE PRACTITIONER

## 2022-12-20 PROCEDURE — 3061F PR NEG MICROALBUMINURIA RESULT DOCUMENTED/REVIEW: ICD-10-PCS | Mod: CPTII,S$GLB,, | Performed by: NURSE PRACTITIONER

## 2022-12-20 PROCEDURE — 1126F PR PAIN SEVERITY QUANTIFIED, NO PAIN PRESENT: ICD-10-PCS | Mod: CPTII,S$GLB,, | Performed by: NURSE PRACTITIONER

## 2022-12-20 PROCEDURE — 99214 PR OFFICE/OUTPT VISIT, EST, LEVL IV, 30-39 MIN: ICD-10-PCS | Mod: S$GLB,,, | Performed by: NURSE PRACTITIONER

## 2022-12-20 PROCEDURE — 3061F NEG MICROALBUMINURIA REV: CPT | Mod: CPTII,S$GLB,, | Performed by: NURSE PRACTITIONER

## 2022-12-20 PROCEDURE — 3066F NEPHROPATHY DOC TX: CPT | Mod: CPTII,S$GLB,, | Performed by: NURSE PRACTITIONER

## 2022-12-20 PROCEDURE — 3078F DIAST BP <80 MM HG: CPT | Mod: CPTII,S$GLB,, | Performed by: NURSE PRACTITIONER

## 2022-12-20 PROCEDURE — 1126F AMNT PAIN NOTED NONE PRSNT: CPT | Mod: CPTII,S$GLB,, | Performed by: NURSE PRACTITIONER

## 2022-12-20 PROCEDURE — 1159F MED LIST DOCD IN RCRD: CPT | Mod: CPTII,S$GLB,, | Performed by: NURSE PRACTITIONER

## 2022-12-20 PROCEDURE — 3074F SYST BP LT 130 MM HG: CPT | Mod: CPTII,S$GLB,, | Performed by: NURSE PRACTITIONER

## 2022-12-20 RX ORDER — OMEPRAZOLE 40 MG/1
40 CAPSULE, DELAYED RELEASE ORAL EVERY MORNING
COMMUNITY
Start: 2022-12-08 | End: 2024-01-11

## 2022-12-20 RX ORDER — DICYCLOMINE HYDROCHLORIDE 10 MG/1
10 CAPSULE ORAL 4 TIMES DAILY PRN
COMMUNITY
Start: 2022-12-08

## 2022-12-20 NOTE — PROGRESS NOTES
CC: Ms. Roseanne Gonzalez arrives today for management of Type 2 DM and review of chronic medical conditions, as listed in the Visit Diagnosis section of this encounter.       HPI: Ms. Roseanne Gonzalez was diagnosed with Type 2 DM in 2007. She was diagnosed based on lab work following CABG. Initial treatment consisted of metformin. Insulin added about 10 years after diagnosis. Denies FH of DM. Denies hospitalizations due to DM.   She has diagnosis of CAD with past CABG. Follows with cardiology.   She also has hyperthyroidism and takes methimazole.     Patient was initially seen by me in June. At that time, Trulicity was prescribed and Toujeo dose was decreased.     Today, she states that her [employer] has been paying for her Trulicity. She is interested to see if the cost will decrease in the new year. I had sent request to Pt Assistance team with [facility] but pt hadn't heard anything.     BG monitoring per [employer] Stephane. However, didn't bring reader today so we cannot download this. Reports most FBG in low 100s.     Hypoglycemia: Infrequent - may occur overnight.   Hypoglycemic Symptoms:  fatigue  Hypoglycemia Treatment: 1/2 candy bar, PB    Missing Insulin/PO medication doses: No    Exercise: Yes - goes to gym class for 1 hour 3 days/week at [employer].    Dietary Habits: Eats 3 meals/day. Rare snacking.     Last DM education appointment: 6/2021    Her son has stage 4 head and neck cancer.       CURRENT DIABETIC MEDS:  Trulicity 0.75 mg weekly, Toujeo Max 32 units QHS  Vial or pen: pen  Glucometer type:     Previous DM treatments:  Metformin - diarrhea   Januvia - rash?    Last Eye Exam: 3/8/2022, no DR. Dr. Laguna  Last Podiatry Exam: n/a    REVIEW OF SYSTEMS  Constitutional: no c/o fatigue, weakness. + 5# weight loss   Eyes: denies visual disturbances.  Cardiac: no palpitations or chest pain.  Respiratory: no cough or dyspnea.  GI: no c/o abdominal pain or nausea. Denies h/o pancreatitis. + GERD  Skin: no  "lesions or rashes.  Neuro: no numbness, tingling, or parasthesias.  Endocrine: denies polyphagia, polydipsia, polyuria      Personally reviewed Past Medical, Surgical, Social History.    Vital Signs  /70   Pulse 60   Ht 5' 4" (1.626 m)   Wt 74.2 kg (163 lb 9.3 oz)   BMI 28.08 kg/m²     Personally reviewed the below labs:    Hemoglobin A1C   Date Value Ref Range Status   10/07/2020 8.0 (H) 4.8 - 5.6 % Final     Comment:              Prediabetes: 5.7 - 6.4           Diabetes: >6.4           Glycemic control for adults with diabetes: <7.0     06/11/2020 7.4 (H) <5.7 % of total Hgb Final     Comment:     For someone without known diabetes, a hemoglobin A1c  value of 6.5% or greater indicates that they may have   diabetes and this should be confirmed with a follow-up   test.     For someone with known diabetes, a value <7% indicates   that their diabetes is well controlled and a value   greater than or equal to 7% indicates suboptimal   control. A1c targets should be individualized based on   duration of diabetes, age, comorbid conditions, and   other considerations.     Currently, no consensus exists regarding use of  hemoglobin A1c for diagnosis of diabetes for children.         10/16/2019 7.4 (H) 4.0 - 5.6 % Final     Comment:     ADA Screening Guidelines:  5.7-6.4%  Consistent with prediabetes  >or=6.5%  Consistent with diabetes  High levels of fetal hemoglobin interfere with the HbA1C  assay. Heterozygous hemoglobin variants (HbS, HgC, etc)do  not significantly interfere with this assay.   However, presence of multiple variants may affect accuracy.       Hemoglobin A1c   Date Value Ref Range Status   11/17/2022 6.5 (H) 4.8 - 5.6 % Final     Comment:              Prediabetes: 5.7 - 6.4           Diabetes: >6.4           Glycemic control for adults with diabetes: <7.0     09/12/2022 6.8 (H) 4.8 - 5.6 % Final     Comment:              Prediabetes: 5.7 - 6.4           Diabetes: >6.4           Glycemic control " for adults with diabetes: <7.0     06/07/2022 8.5 (H) 4.8 - 5.6 % Final     Comment:              Prediabetes: 5.7 - 6.4           Diabetes: >6.4           Glycemic control for adults with diabetes: <7.0         Chemistry        Component Value Date/Time     11/17/2022 0734    K 4.5 11/17/2022 0734     11/17/2022 0734    CO2 23 11/17/2022 0734    BUN 13 11/17/2022 0734    CREATININE 0.94 11/17/2022 0734    GLU 97 11/17/2022 0734        Component Value Date/Time    CALCIUM 9.6 11/17/2022 0734    ALKPHOS 41 (L) 04/30/2021 0758    AST 25 09/12/2022 0803    ALT 19 09/12/2022 0803    BILITOT 0.5 09/12/2022 0803    ESTGFRAFRICA >60.0 04/30/2021 0758    EGFRNONAA 69 02/17/2022 0829          Lab Results   Component Value Date    CHOL 124 11/17/2022    CHOL 173 06/07/2022    CHOL 149 02/17/2022     Lab Results   Component Value Date    HDL 52 11/17/2022    HDL 54 06/07/2022    HDL 59 02/17/2022     Lab Results   Component Value Date    LDLCALC 58 11/17/2022    LDLCALC 100 (H) 06/07/2022    LDLCALC 74 02/17/2022     Lab Results   Component Value Date    TRIG 66 11/17/2022    TRIG 106 06/07/2022    TRIG 82 02/17/2022     Lab Results   Component Value Date    CHOLHDL 28.4 04/30/2021    CHOLHDL 2.9 06/11/2020    CHOLHDL 32.1 10/16/2019       Lab Results   Component Value Date    MICALBCREAT 5 06/11/2020     Lab Results   Component Value Date    TSH 2.900 06/07/2022       CrCl cannot be calculated (Patient's most recent lab result is older than the maximum 7 days allowed.).    Vit D, 25-Hydroxy   Date Value Ref Range Status   11/06/2017 41 30 - 96 ng/mL Final     Comment:     Vitamin D deficiency.........<10 ng/mL                              Vitamin D insufficiency......10-29 ng/mL       Vitamin D sufficiency........> or equal to 30 ng/mL  Vitamin D toxicity............>100 ng/mL           PHYSICAL EXAMINATION  Constitutional: Appears well, no distress  Neck: Supple, trachea midline.  Respiratory: CTA, even and  unlabored.  Cardiovascular: RRR, no murmurs, no carotid bruits.   GI: bowel sounds active, no hernia noted  Skin: warm and dry; no visible wounds  Neuro: oriented to person, place, time  Feet: appropriate footwear.         Goals        HEMOGLOBIN A1C < 7               Assessment/Plan  1. Type 2 diabetes mellitus with other specified complication, with long-term current use of insulin  -- Controlled. I suggested decreasing insulin dose but pt declined. I advised her to notify me if hypoglycemia becomes more frequent.   -- continue Toujeo Max and Trulicity at current doses.  -- discussed looping in Pt Assistance team if Trulicity remains expensive after the 1st of the year.   -- continue Freestyle Stephane    -- Discussed diagnosis of DM, A1c goals, progression of disease, long term complications and tx options.    -- Reviewed hypoglycemia management: treat with 4 oz of juice, 4 oz regular soda, or 4 glucose tablets. Monitor and repeat treatment every 15 minutes until BG is >70 Then have a snack, which includes 15 grams of complex carbohydrates and protein.   Advised patient to check BG before activities, such as driving or exercise.    -- takes statin, aspirin, ARB   2. Coronary artery disease involving native coronary artery of native heart without angina pectoris  -- follows with cardiology  -- avoid hypoglycemia   3. Primary hypertension  -- controlled  -- on ARB   4. Hyperthyroidism  -- controlled  -- continue methimazole (takes M,W,F)  -- TSH with RTC   5. Hyperlipidemia, unspecified hyperlipidemia type  -- controlled  -- continue Crestor       FOLLOW UP  Follow up in about 6 months (around 6/20/2023).   Patient instructed to bring BG logs to each follow up   Patient encouraged to call for any BG/medication issues, concerns, or questions.

## 2022-12-21 ENCOUNTER — TELEPHONE (OUTPATIENT)
Dept: ENDOCRINOLOGY | Facility: CLINIC | Age: 77
End: 2022-12-21
Payer: MEDICARE

## 2022-12-21 NOTE — TELEPHONE ENCOUNTER
----- Message from Galo Vizcainone sent at 12/21/2022  3:33 PM CST -----  Contact: pt at [phone]  Type: Needs Medical Advice  Who Called:  pt  Pharmacy name and phone #:    [id number] - [place], [place]  [facility] [address] [facility] [address]  [facility] [address] [place] [id number]  Phone: [phone] Fax: [phone]  Best Call Back Number: [phone]  Additional Information: pt would like her labs faxed to the pharmacy listed above. Please call back to advise.

## 2023-05-18 ENCOUNTER — TELEPHONE (OUTPATIENT)
Dept: ENDOCRINOLOGY | Facility: CLINIC | Age: 78
End: 2023-05-18
Payer: MEDICARE

## 2023-06-01 RX ORDER — CARVEDILOL 3.12 MG/1
TABLET ORAL
Qty: 180 TABLET | Refills: 4 | Status: SHIPPED | OUTPATIENT
Start: 2023-06-01

## 2023-06-05 DIAGNOSIS — Z79.4 TYPE 2 DIABETES MELLITUS WITH OTHER SPECIFIED COMPLICATION, WITH LONG-TERM CURRENT USE OF INSULIN: ICD-10-CM

## 2023-06-05 DIAGNOSIS — E11.69 TYPE 2 DIABETES MELLITUS WITH OTHER SPECIFIED COMPLICATION, WITH LONG-TERM CURRENT USE OF INSULIN: ICD-10-CM

## 2023-06-05 RX ORDER — DULAGLUTIDE 0.75 MG/.5ML
0.75 INJECTION, SOLUTION SUBCUTANEOUS
Qty: 4 PEN | Refills: 6 | Status: SHIPPED | OUTPATIENT
Start: 2023-06-05 | End: 2023-09-26 | Stop reason: SDUPTHER

## 2023-06-05 NOTE — TELEPHONE ENCOUNTER
----- Message from Sherry Gomez sent at 6/5/2023  8:38 AM CDT -----  Regarding: refill  Hi, pt is calling in today to request a refill on the dulaglutide (TRULICITY) 0.75 mg/0.5 mL pen injector. Pls call the pt at [phone]      [facility] #[id number] - [place] [address] & [address] [address]  Phone: [phone] Fax: [phone]

## 2023-06-20 ENCOUNTER — OFFICE VISIT (OUTPATIENT)
Dept: ENDOCRINOLOGY | Facility: CLINIC | Age: 78
End: 2023-06-20
Payer: MEDICARE

## 2023-06-20 VITALS
SYSTOLIC BLOOD PRESSURE: 110 MMHG | BODY MASS INDEX: 27.99 KG/M2 | DIASTOLIC BLOOD PRESSURE: 70 MMHG | HEIGHT: 64 IN | HEART RATE: 52 BPM | WEIGHT: 163.94 LBS

## 2023-06-20 DIAGNOSIS — I25.10 CORONARY ARTERY DISEASE INVOLVING NATIVE CORONARY ARTERY OF NATIVE HEART WITHOUT ANGINA PECTORIS: ICD-10-CM

## 2023-06-20 DIAGNOSIS — I10 PRIMARY HYPERTENSION: ICD-10-CM

## 2023-06-20 DIAGNOSIS — E78.5 HYPERLIPIDEMIA, UNSPECIFIED HYPERLIPIDEMIA TYPE: ICD-10-CM

## 2023-06-20 DIAGNOSIS — E05.90 HYPERTHYROIDISM: ICD-10-CM

## 2023-06-20 DIAGNOSIS — E11.69 TYPE 2 DIABETES MELLITUS WITH OTHER SPECIFIED COMPLICATION, WITH LONG-TERM CURRENT USE OF INSULIN: Primary | ICD-10-CM

## 2023-06-20 DIAGNOSIS — Z79.4 TYPE 2 DIABETES MELLITUS WITH OTHER SPECIFIED COMPLICATION, WITH LONG-TERM CURRENT USE OF INSULIN: Primary | ICD-10-CM

## 2023-06-20 PROCEDURE — 3078F PR MOST RECENT DIASTOLIC BLOOD PRESSURE < 80 MM HG: ICD-10-PCS | Mod: CPTII,S$GLB,, | Performed by: NURSE PRACTITIONER

## 2023-06-20 PROCEDURE — 95251 CONT GLUC MNTR ANALYSIS I&R: CPT | Mod: S$GLB,,, | Performed by: NURSE PRACTITIONER

## 2023-06-20 PROCEDURE — 3074F SYST BP LT 130 MM HG: CPT | Mod: CPTII,S$GLB,, | Performed by: NURSE PRACTITIONER

## 2023-06-20 PROCEDURE — 3044F PR MOST RECENT HEMOGLOBIN A1C LEVEL <7.0%: ICD-10-PCS | Mod: CPTII,S$GLB,, | Performed by: NURSE PRACTITIONER

## 2023-06-20 PROCEDURE — 3074F PR MOST RECENT SYSTOLIC BLOOD PRESSURE < 130 MM HG: ICD-10-PCS | Mod: CPTII,S$GLB,, | Performed by: NURSE PRACTITIONER

## 2023-06-20 PROCEDURE — 1126F AMNT PAIN NOTED NONE PRSNT: CPT | Mod: CPTII,S$GLB,, | Performed by: NURSE PRACTITIONER

## 2023-06-20 PROCEDURE — 99214 OFFICE O/P EST MOD 30 MIN: CPT | Mod: S$GLB,,, | Performed by: NURSE PRACTITIONER

## 2023-06-20 PROCEDURE — 1160F RVW MEDS BY RX/DR IN RCRD: CPT | Mod: CPTII,S$GLB,, | Performed by: NURSE PRACTITIONER

## 2023-06-20 PROCEDURE — 1159F MED LIST DOCD IN RCRD: CPT | Mod: CPTII,S$GLB,, | Performed by: NURSE PRACTITIONER

## 2023-06-20 PROCEDURE — 1101F PT FALLS ASSESS-DOCD LE1/YR: CPT | Mod: CPTII,S$GLB,, | Performed by: NURSE PRACTITIONER

## 2023-06-20 PROCEDURE — 1126F PR PAIN SEVERITY QUANTIFIED, NO PAIN PRESENT: ICD-10-PCS | Mod: CPTII,S$GLB,, | Performed by: NURSE PRACTITIONER

## 2023-06-20 PROCEDURE — 1101F PR PT FALLS ASSESS DOC 0-1 FALLS W/OUT INJ PAST YR: ICD-10-PCS | Mod: CPTII,S$GLB,, | Performed by: NURSE PRACTITIONER

## 2023-06-20 PROCEDURE — 3044F HG A1C LEVEL LT 7.0%: CPT | Mod: CPTII,S$GLB,, | Performed by: NURSE PRACTITIONER

## 2023-06-20 PROCEDURE — 1159F PR MEDICATION LIST DOCUMENTED IN MEDICAL RECORD: ICD-10-PCS | Mod: CPTII,S$GLB,, | Performed by: NURSE PRACTITIONER

## 2023-06-20 PROCEDURE — 99214 PR OFFICE/OUTPT VISIT, EST, LEVL IV, 30-39 MIN: ICD-10-PCS | Mod: S$GLB,,, | Performed by: NURSE PRACTITIONER

## 2023-06-20 PROCEDURE — 3288F PR FALLS RISK ASSESSMENT DOCUMENTED: ICD-10-PCS | Mod: CPTII,S$GLB,, | Performed by: NURSE PRACTITIONER

## 2023-06-20 PROCEDURE — 3078F DIAST BP <80 MM HG: CPT | Mod: CPTII,S$GLB,, | Performed by: NURSE PRACTITIONER

## 2023-06-20 PROCEDURE — 95251 PR GLUCOSE MONITOR, 72 HOUR, PHYS INTERP: ICD-10-PCS | Mod: S$GLB,,, | Performed by: NURSE PRACTITIONER

## 2023-06-20 PROCEDURE — 99999 PR PBB SHADOW E&M-EST. PATIENT-LVL V: CPT | Mod: PBBFAC,,, | Performed by: NURSE PRACTITIONER

## 2023-06-20 PROCEDURE — 1160F PR REVIEW ALL MEDS BY PRESCRIBER/CLIN PHARMACIST DOCUMENTED: ICD-10-PCS | Mod: CPTII,S$GLB,, | Performed by: NURSE PRACTITIONER

## 2023-06-20 PROCEDURE — 99999 PR PBB SHADOW E&M-EST. PATIENT-LVL V: ICD-10-PCS | Mod: PBBFAC,,, | Performed by: NURSE PRACTITIONER

## 2023-06-20 PROCEDURE — 3288F FALL RISK ASSESSMENT DOCD: CPT | Mod: CPTII,S$GLB,, | Performed by: NURSE PRACTITIONER

## 2023-06-20 RX ORDER — INSULIN GLARGINE 300 U/ML
28 INJECTION, SOLUTION SUBCUTANEOUS DAILY
Qty: 2 PEN | Refills: 11
Start: 2023-06-20 | End: 2023-10-26

## 2023-06-20 RX ORDER — SEMAGLUTIDE 0.68 MG/ML
0.25 INJECTION, SOLUTION SUBCUTANEOUS
Qty: 3 ML | Refills: 6
Start: 2023-06-20 | End: 2023-10-04

## 2023-06-20 NOTE — PROGRESS NOTES
"CC: Ms. Roseanne Gonzalez arrives today for management of Type 2 DM and review of chronic medical conditions, as listed in the Visit Diagnosis section of this encounter.       HPI: Ms. Roseanne Gonzalez was diagnosed with Type 2 DM in 2007. She was diagnosed based on lab work following CABG. Initial treatment consisted of metformin. Insulin added about 10 years after diagnosis. Denies FH of DM. Denies hospitalizations due to DM.   She has diagnosis of CAD with past CABG. Follows with cardiology.   She also has hyperthyroidism and takes methimazole.     Patient was initially seen by me in December.    Her Trulicity is expensive. Her [employer] is paying for this for her through August.     BG monitoring per [employer] Stephane.    Hypoglycemia: Rare   Hypoglycemic Symptoms:  fatigue  Hypoglycemia Treatment: 1/2 candy bar, PB    Missing Insulin/PO medication doses: No    Exercise: Yes - goes to gym class for 1 hour 3 days/week at [employer].    Dietary Habits: Eats 3 meals/day. May snack at bedtime if BG < 100.    Last DM education appointment: 6/2021      CURRENT DIABETIC MEDS:  Trulicity 0.75 mg weekly, Toujeo Max 32 units QHS  Vial or pen: pen  Glucometer type:     Previous DM treatments:  Metformin - diarrhea   Januvia - rash?    Last Eye Exam: 5/2023, no DR. Dr. Caraballo (@ Dr. Boo's office)  Last Podiatry Exam: n/a    REVIEW OF SYSTEMS  Constitutional: no c/o fatigue, weakness, weight loss   Cardiac: no palpitations or chest pain.  Respiratory: no cough or dyspnea.  GI: no c/o abdominal pain or nausea. Denies h/o pancreatitis.  Skin: no lesions or rashes.  Neuro: no numbness, tingling, or parasthesias.  Endocrine: denies polyphagia, polydipsia, polyuria      Personally reviewed Past Medical, Surgical, Social History.    Vital Signs  /70   Pulse (!) 52   Ht 5' 4" (1.626 m)   Wt 74.3 kg (163 lb 14.6 oz)   BMI 28.14 kg/m²     Personally reviewed the below labs:    Hemoglobin A1C   Date Value Ref Range " Status   10/07/2020 8.0 (H) 4.8 - 5.6 % Final     Comment:              Prediabetes: 5.7 - 6.4           Diabetes: >6.4           Glycemic control for adults with diabetes: <7.0     06/11/2020 7.4 (H) <5.7 % of total Hgb Final     Comment:     For someone without known diabetes, a hemoglobin A1c  value of 6.5% or greater indicates that they may have   diabetes and this should be confirmed with a follow-up   test.     For someone with known diabetes, a value <7% indicates   that their diabetes is well controlled and a value   greater than or equal to 7% indicates suboptimal   control. A1c targets should be individualized based on   duration of diabetes, age, comorbid conditions, and   other considerations.     Currently, no consensus exists regarding use of  hemoglobin A1c for diagnosis of diabetes for children.         10/16/2019 7.4 (H) 4.0 - 5.6 % Final     Comment:     ADA Screening Guidelines:  5.7-6.4%  Consistent with prediabetes  >or=6.5%  Consistent with diabetes  High levels of fetal hemoglobin interfere with the HbA1C  assay. Heterozygous hemoglobin variants (HbS, HgC, etc)do  not significantly interfere with this assay.   However, presence of multiple variants may affect accuracy.       Hemoglobin A1c   Date Value Ref Range Status   11/17/2022 6.5 (H) 4.8 - 5.6 % Final     Comment:              Prediabetes: 5.7 - 6.4           Diabetes: >6.4           Glycemic control for adults with diabetes: <7.0     09/12/2022 6.8 (H) 4.8 - 5.6 % Final     Comment:              Prediabetes: 5.7 - 6.4           Diabetes: >6.4           Glycemic control for adults with diabetes: <7.0     06/07/2022 8.5 (H) 4.8 - 5.6 % Final     Comment:              Prediabetes: 5.7 - 6.4           Diabetes: >6.4           Glycemic control for adults with diabetes: <7.0         Chemistry        Component Value Date/Time     05/16/2023 1157    K 4.5 05/16/2023 1157    CL 99 05/16/2023 1157    CO2 29 05/16/2023 1157    BUN 27 (H)  05/16/2023 1157    CREATININE 1.04 05/16/2023 1157     05/16/2023 1157        Component Value Date/Time    CALCIUM 9.3 05/16/2023 1157    ALKPHOS 43 05/16/2023 1157    AST 30 05/16/2023 1157    ALT 20 05/16/2023 1157    BILITOT 0.7 05/16/2023 1157    ESTGFRAFRICA >60.0 04/30/2021 0758    EGFRNONAA 69 02/17/2022 0829          Lab Results   Component Value Date    CHOL 124 11/17/2022    CHOL 173 06/07/2022    CHOL 149 02/17/2022     Lab Results   Component Value Date    HDL 52 11/17/2022    HDL 54 06/07/2022    HDL 59 02/17/2022     Lab Results   Component Value Date    LDLCALC 58 11/17/2022    LDLCALC 100 (H) 06/07/2022    LDLCALC 74 02/17/2022     Lab Results   Component Value Date    TRIG 66 11/17/2022    TRIG 106 06/07/2022    TRIG 82 02/17/2022     Lab Results   Component Value Date    CHOLHDL 28.4 04/30/2021    CHOLHDL 2.9 06/11/2020    CHOLHDL 32.1 10/16/2019       Lab Results   Component Value Date    MICALBCREAT 4.5 05/16/2023     Lab Results   Component Value Date    TSH 1.110 05/16/2023       CrCl cannot be calculated (Patient's most recent lab result is older than the maximum 7 days allowed.).    Vit D, 25-Hydroxy   Date Value Ref Range Status   11/06/2017 41 30 - 96 ng/mL Final     Comment:     Vitamin D deficiency.........<10 ng/mL                              Vitamin D insufficiency......10-29 ng/mL       Vitamin D sufficiency........> or equal to 30 ng/mL  Vitamin D toxicity............>100 ng/mL           PHYSICAL EXAMINATION  Constitutional: Appears well, no distress  Respiratory: CTA, even and unlabored.  Cardiovascular: + bradycardic. Regular rhythm, no murmurs, no carotid bruits.   GI: bowel sounds active, no hernia noted  Skin: warm and dry; no visible wounds  Neuro: oriented to person, place, time  Feet: appropriate footwear.      FREESTYLE FILIBERTO DOWNLOAD: See media file for details. Many fasting glucoses are reasonable but fasting hypoglycemia noted some mornings. Prandial excursions  noted.   Average glucose: 141 mg/dL  Above 250 mg/dL: 2 %  181-250 mg/dL: 15 %   mg/dL: 80 %  54-69 mg/dL: 3 %  Below 54 mg/dL: 0 %         Goals        HEMOGLOBIN A1C < 7               Assessment/Plan  1. Type 2 diabetes mellitus with other specified complication, with long-term current use of insulin  -- Controlled but having some fasting hypoglycemia and prandial excursions.   -- decrease Toujeo Max to 28 units.  -- continue Trulicity for now  -- will loop in Pt Assistance team to look into Ozempic in order to replace Trulicity. Start Ozempic 0.25 mg weekly. After 4 weeks, increase dose to 0.5 mg weekly. Discussed medication's mechanism of action, side effects, and contraindications. Advised pt to notify me for extreme nausea, abdominal pain, etc.  -- continue Freestyle Stephane    -- Discussed diagnosis of DM, A1c goals, progression of disease, long term complications and tx options.    -- Reviewed hypoglycemia management: treat with 4 oz of juice, 4 oz regular soda, or 4 glucose tablets. Monitor and repeat treatment every 15 minutes until BG is >70 Then have a snack, which includes 15 grams of complex carbohydrates and protein.   Advised patient to check BG before activities, such as driving or exercise.    -- takes statin, aspirin, ARB   2. Coronary artery disease involving native coronary artery of native heart without angina pectoris  -- follows with cardiology  -- avoid hypoglycemia   3. Primary hypertension  -- controlled  -- on ARB   4. Hyperthyroidism  -- stable  -- continue methimazole (takes M,W,F)   5. Hyperlipidemia, unspecified hyperlipidemia type  -- controlled  -- continue Crestor  -- lipid panel with RTC       FOLLOW UP  Follow up in about 4 months (around 10/20/2023).   Patient instructed to bring BG logs to each follow up   Patient encouraged to call for any BG/medication issues, concerns, or questions.    Orders Placed This Encounter   Procedures    Hemoglobin A1C    Basic Metabolic Panel     Lipid Panel

## 2023-06-20 NOTE — PATIENT INSTRUCTIONS
Start Ozempic 0.25 mg weekly. After 4 weeks, increase dose to 0.5 mg weekly. Notify me for extreme nausea, abdominal pain, etc.    Decrease Toujeo to 28 units    Fasting labs before next follow up

## 2023-06-29 ENCOUNTER — HOSPITAL ENCOUNTER (OUTPATIENT)
Dept: RADIOLOGY | Facility: HOSPITAL | Age: 78
Discharge: HOME | End: 2023-06-29
Attending: NURSE PRACTITIONER
Payer: MEDICARE

## 2023-06-29 DIAGNOSIS — M79.89 LEG SWELLING: ICD-10-CM

## 2023-06-29 DIAGNOSIS — M79.2 NEURALGIA: ICD-10-CM

## 2023-06-29 DIAGNOSIS — I25.10 CORONARY ARTERY DISEASE INVOLVING NATIVE CORONARY ARTERY OF NATIVE HEART WITHOUT ANGINA PECTORIS: ICD-10-CM

## 2023-06-29 DIAGNOSIS — R10.31 RIGHT GROIN PAIN: ICD-10-CM

## 2023-06-29 PROCEDURE — 93925 US ARTERIAL LOWER EXTREMITY BILAT WITH ABI (XPD): ICD-10-PCS | Mod: 26,,, | Performed by: RADIOLOGY

## 2023-06-29 PROCEDURE — 93922 UPR/L XTREMITY ART 2 LEVELS: CPT | Mod: 26,,, | Performed by: RADIOLOGY

## 2023-06-29 PROCEDURE — 93922 US ARTERIAL LOWER EXTREMITY BILAT WITH ABI (XPD): ICD-10-PCS | Mod: 26,,, | Performed by: RADIOLOGY

## 2023-06-29 PROCEDURE — 93925 LOWER EXTREMITY STUDY: CPT | Mod: 26,,, | Performed by: RADIOLOGY

## 2023-06-29 PROCEDURE — 93925 LOWER EXTREMITY STUDY: CPT | Mod: TC,PO

## 2023-09-26 DIAGNOSIS — E11.69 TYPE 2 DIABETES MELLITUS WITH OTHER SPECIFIED COMPLICATION, WITH LONG-TERM CURRENT USE OF INSULIN: ICD-10-CM

## 2023-09-26 DIAGNOSIS — Z79.4 TYPE 2 DIABETES MELLITUS WITH OTHER SPECIFIED COMPLICATION, WITH LONG-TERM CURRENT USE OF INSULIN: ICD-10-CM

## 2023-09-26 RX ORDER — DULAGLUTIDE 0.75 MG/.5ML
INJECTION, SOLUTION SUBCUTANEOUS
Qty: 12 PEN | Refills: 1 | Status: SHIPPED | OUTPATIENT
Start: 2023-09-26 | End: 2023-10-04 | Stop reason: SDUPTHER

## 2023-09-26 NOTE — TELEPHONE ENCOUNTER
S/w pt states if you can send Rx Trulicity for 3 months at a time to the [facility] in [place].States her [employer] is not going to help her pay for it anymore. She said she called her insurance to see if she could get anything cheaper,but hey said they cost about the same

## 2023-09-26 NOTE — TELEPHONE ENCOUNTER
----- Message from Ryan Mg sent at 9/26/2023  9:30 AM CDT -----  Contact: pt  Type: Needs Medical Advice  Who Called:  pt  Best Call Back Number: [phone]    Additional Information: Pt is calling the office needs to speak with the nurse regarding prescription needs new script sent in.Please call back and advise.

## 2023-10-04 DIAGNOSIS — Z79.4 TYPE 2 DIABETES MELLITUS WITH OTHER SPECIFIED COMPLICATION, WITH LONG-TERM CURRENT USE OF INSULIN: ICD-10-CM

## 2023-10-04 DIAGNOSIS — E11.69 TYPE 2 DIABETES MELLITUS WITH OTHER SPECIFIED COMPLICATION, WITH LONG-TERM CURRENT USE OF INSULIN: ICD-10-CM

## 2023-10-04 RX ORDER — DULAGLUTIDE 0.75 MG/.5ML
INJECTION, SOLUTION SUBCUTANEOUS
Qty: 12 PEN | Refills: 3 | Status: SHIPPED | OUTPATIENT
Start: 2023-10-04 | End: 2023-12-14

## 2023-10-26 ENCOUNTER — OFFICE VISIT (OUTPATIENT)
Dept: ENDOCRINOLOGY | Facility: CLINIC | Age: 78
End: 2023-10-26
Payer: MEDICARE

## 2023-10-26 ENCOUNTER — LAB VISIT (OUTPATIENT)
Dept: LAB | Facility: HOSPITAL | Age: 78
End: 2023-10-26
Attending: NURSE PRACTITIONER
Payer: MEDICARE

## 2023-10-26 ENCOUNTER — TELEPHONE (OUTPATIENT)
Dept: ENDOCRINOLOGY | Facility: CLINIC | Age: 78
End: 2023-10-26

## 2023-10-26 VITALS
HEART RATE: 96 BPM | SYSTOLIC BLOOD PRESSURE: 118 MMHG | DIASTOLIC BLOOD PRESSURE: 70 MMHG | HEIGHT: 64 IN | BODY MASS INDEX: 27.27 KG/M2 | WEIGHT: 159.75 LBS

## 2023-10-26 DIAGNOSIS — E11.69 TYPE 2 DIABETES MELLITUS WITH OTHER SPECIFIED COMPLICATION, WITH LONG-TERM CURRENT USE OF INSULIN: Primary | ICD-10-CM

## 2023-10-26 DIAGNOSIS — E05.90 HYPERTHYROIDISM: ICD-10-CM

## 2023-10-26 DIAGNOSIS — E78.5 HYPERLIPIDEMIA, UNSPECIFIED HYPERLIPIDEMIA TYPE: ICD-10-CM

## 2023-10-26 DIAGNOSIS — Z79.4 TYPE 2 DIABETES MELLITUS WITH OTHER SPECIFIED COMPLICATION, WITH LONG-TERM CURRENT USE OF INSULIN: ICD-10-CM

## 2023-10-26 DIAGNOSIS — I25.10 CORONARY ARTERY DISEASE INVOLVING NATIVE CORONARY ARTERY OF NATIVE HEART WITHOUT ANGINA PECTORIS: ICD-10-CM

## 2023-10-26 DIAGNOSIS — Z79.4 TYPE 2 DIABETES MELLITUS WITH OTHER SPECIFIED COMPLICATION, WITH LONG-TERM CURRENT USE OF INSULIN: Primary | ICD-10-CM

## 2023-10-26 DIAGNOSIS — E11.69 TYPE 2 DIABETES MELLITUS WITH OTHER SPECIFIED COMPLICATION, WITH LONG-TERM CURRENT USE OF INSULIN: ICD-10-CM

## 2023-10-26 DIAGNOSIS — I10 PRIMARY HYPERTENSION: ICD-10-CM

## 2023-10-26 LAB
ESTIMATED AVG GLUCOSE: 131 MG/DL (ref 68–131)
HBA1C MFR BLD: 6.2 % (ref 4–5.6)

## 2023-10-26 PROCEDURE — 1159F PR MEDICATION LIST DOCUMENTED IN MEDICAL RECORD: ICD-10-PCS | Mod: CPTII,S$GLB,, | Performed by: NURSE PRACTITIONER

## 2023-10-26 PROCEDURE — 99214 OFFICE O/P EST MOD 30 MIN: CPT | Mod: S$GLB,,, | Performed by: NURSE PRACTITIONER

## 2023-10-26 PROCEDURE — 99999 PR PBB SHADOW E&M-EST. PATIENT-LVL V: CPT | Mod: PBBFAC,,, | Performed by: NURSE PRACTITIONER

## 2023-10-26 PROCEDURE — 36415 COLL VENOUS BLD VENIPUNCTURE: CPT | Mod: PN | Performed by: NURSE PRACTITIONER

## 2023-10-26 PROCEDURE — 3074F SYST BP LT 130 MM HG: CPT | Mod: CPTII,S$GLB,, | Performed by: NURSE PRACTITIONER

## 2023-10-26 PROCEDURE — 1160F PR REVIEW ALL MEDS BY PRESCRIBER/CLIN PHARMACIST DOCUMENTED: ICD-10-PCS | Mod: CPTII,S$GLB,, | Performed by: NURSE PRACTITIONER

## 2023-10-26 PROCEDURE — 95251 CONT GLUC MNTR ANALYSIS I&R: CPT | Mod: S$GLB,,, | Performed by: NURSE PRACTITIONER

## 2023-10-26 PROCEDURE — 1126F AMNT PAIN NOTED NONE PRSNT: CPT | Mod: CPTII,S$GLB,, | Performed by: NURSE PRACTITIONER

## 2023-10-26 PROCEDURE — 95251 PR GLUCOSE MONITOR, 72 HOUR, PHYS INTERP: ICD-10-PCS | Mod: S$GLB,,, | Performed by: NURSE PRACTITIONER

## 2023-10-26 PROCEDURE — 83036 HEMOGLOBIN GLYCOSYLATED A1C: CPT | Performed by: NURSE PRACTITIONER

## 2023-10-26 PROCEDURE — 1101F PT FALLS ASSESS-DOCD LE1/YR: CPT | Mod: CPTII,S$GLB,, | Performed by: NURSE PRACTITIONER

## 2023-10-26 PROCEDURE — 3078F PR MOST RECENT DIASTOLIC BLOOD PRESSURE < 80 MM HG: ICD-10-PCS | Mod: CPTII,S$GLB,, | Performed by: NURSE PRACTITIONER

## 2023-10-26 PROCEDURE — 3288F FALL RISK ASSESSMENT DOCD: CPT | Mod: CPTII,S$GLB,, | Performed by: NURSE PRACTITIONER

## 2023-10-26 PROCEDURE — 99999 PR PBB SHADOW E&M-EST. PATIENT-LVL V: ICD-10-PCS | Mod: PBBFAC,,, | Performed by: NURSE PRACTITIONER

## 2023-10-26 PROCEDURE — 1126F PR PAIN SEVERITY QUANTIFIED, NO PAIN PRESENT: ICD-10-PCS | Mod: CPTII,S$GLB,, | Performed by: NURSE PRACTITIONER

## 2023-10-26 PROCEDURE — 1101F PR PT FALLS ASSESS DOC 0-1 FALLS W/OUT INJ PAST YR: ICD-10-PCS | Mod: CPTII,S$GLB,, | Performed by: NURSE PRACTITIONER

## 2023-10-26 PROCEDURE — 1159F MED LIST DOCD IN RCRD: CPT | Mod: CPTII,S$GLB,, | Performed by: NURSE PRACTITIONER

## 2023-10-26 PROCEDURE — 3078F DIAST BP <80 MM HG: CPT | Mod: CPTII,S$GLB,, | Performed by: NURSE PRACTITIONER

## 2023-10-26 PROCEDURE — 3074F PR MOST RECENT SYSTOLIC BLOOD PRESSURE < 130 MM HG: ICD-10-PCS | Mod: CPTII,S$GLB,, | Performed by: NURSE PRACTITIONER

## 2023-10-26 PROCEDURE — 1160F RVW MEDS BY RX/DR IN RCRD: CPT | Mod: CPTII,S$GLB,, | Performed by: NURSE PRACTITIONER

## 2023-10-26 PROCEDURE — 99214 PR OFFICE/OUTPT VISIT, EST, LEVL IV, 30-39 MIN: ICD-10-PCS | Mod: S$GLB,,, | Performed by: NURSE PRACTITIONER

## 2023-10-26 PROCEDURE — 3288F PR FALLS RISK ASSESSMENT DOCUMENTED: ICD-10-PCS | Mod: CPTII,S$GLB,, | Performed by: NURSE PRACTITIONER

## 2023-10-26 RX ORDER — SEMAGLUTIDE 0.68 MG/ML
INJECTION, SOLUTION SUBCUTANEOUS
Qty: 3 ML | Refills: 6
Start: 2023-10-26 | End: 2023-12-14 | Stop reason: SDUPTHER

## 2023-10-26 RX ORDER — INSULIN GLARGINE 300 U/ML
26 INJECTION, SOLUTION SUBCUTANEOUS NIGHTLY
Qty: 4.5 ML | Refills: 6 | Status: SHIPPED | OUTPATIENT
Start: 2023-10-26 | End: 2024-01-10 | Stop reason: SDUPTHER

## 2023-11-01 ENCOUNTER — TELEPHONE (OUTPATIENT)
Dept: PHARMACY | Facility: CLINIC | Age: 78
End: 2023-11-01
Payer: MEDICARE

## 2023-11-01 NOTE — TELEPHONE ENCOUNTER
I have spoken with Roseanne Gonzalez and informed her of the [employer] application process for Ozempic and what's required to apply.  Roseanne Gonzalez will provide the following documents: Proof of household Income( such as social security statement, 1099 form, pension statement or 3 consecutive pay stubs and Signed and dated HIPAA /Patient Information Forms        I will follow up with the patient in 5 business days.

## 2023-11-05 ENCOUNTER — NURSE TRIAGE (OUTPATIENT)
Dept: ADMINISTRATIVE | Facility: CLINIC | Age: 78
End: 2023-11-05
Payer: MEDICARE

## 2023-11-06 ENCOUNTER — TELEPHONE (OUTPATIENT)
Dept: ENDOCRINOLOGY | Facility: CLINIC | Age: 78
End: 2023-11-06
Payer: MEDICARE

## 2023-11-06 NOTE — TELEPHONE ENCOUNTER
PT Discharge        Patient has not returned to physical therapy since their visit on 2/6/2018  Measurements were unable to be updated  As a result, patient is discharged from physical therapy  Reviewed nurse triage report. Please call patient just to clarify: Long acting insulin should not be taken to lower an elevated blood sugar. It lasts 24 hours and doesn't have a true peak to it so only should be taken once every 24 hours

## 2023-11-06 NOTE — TELEPHONE ENCOUNTER
Pt states that she is scheduled to take 24 units of Toujeo every evening. Pt states that she took 16 units of Toujeo at 9 am because her BS was 167. Pt states that BS now 143. Pt asking can she take 24 units of Toujeo tonight. Mary Lou Small PA notified per protocol advised that pt take remainder 8 units of Toujeo tonight and cont with 24 units tomorrow as ordered.  Pt made aware. VU. Encounter routed to provider.       Reason for Disposition   [1] Follow-up call from patient regarding patient's clinical status AND [2] information urgent    Protocols used: PCP Call - No Triage-A-

## 2023-11-10 ENCOUNTER — TELEPHONE (OUTPATIENT)
Dept: ENDOCRINOLOGY | Facility: CLINIC | Age: 78
End: 2023-11-10
Payer: MEDICARE

## 2023-11-15 NOTE — TELEPHONE ENCOUNTER
The signed and completed patient assistance application was received from the providers office and  has been submitted to [employer] for consideration of eligibility.

## 2023-12-14 DIAGNOSIS — Z79.4 TYPE 2 DIABETES MELLITUS WITH OTHER SPECIFIED COMPLICATION, WITH LONG-TERM CURRENT USE OF INSULIN: ICD-10-CM

## 2023-12-14 DIAGNOSIS — E11.69 TYPE 2 DIABETES MELLITUS WITH OTHER SPECIFIED COMPLICATION, WITH LONG-TERM CURRENT USE OF INSULIN: ICD-10-CM

## 2023-12-14 RX ORDER — SEMAGLUTIDE 0.68 MG/ML
INJECTION, SOLUTION SUBCUTANEOUS
Qty: 2 EACH | Refills: 0 | Status: SHIPPED | OUTPATIENT
Start: 2023-12-14 | End: 2024-01-17 | Stop reason: SDUPTHER

## 2023-12-14 NOTE — TELEPHONE ENCOUNTER
We are pleased to inform you Roseanne Gonzalez has been approved in the [employer] Patient Assistance Program.  [employer] has shared this information with your patient.   Approval Details  Eligibility start Date: 12/6/23  Eligibility end date: 12/31/2023.  Approved Medication: Ozempic  Day supply: 120  Allotted Refills: {0  Estimated Shipping: 10-14 business days.  Shipping Location: [facility]      Important Note  It is imperative that any changes to strength or discontinuation of this medication be referred to the Pharmacy Patient Assistance Team Pool Via [employer] to prevent Gaps in therapy.

## 2023-12-20 ENCOUNTER — TELEPHONE (OUTPATIENT)
Dept: ENDOCRINOLOGY | Facility: CLINIC | Age: 78
End: 2023-12-20
Payer: MEDICARE

## 2023-12-20 PROBLEM — E66.09 CLASS 1 OBESITY DUE TO EXCESS CALORIES WITH SERIOUS COMORBIDITY AND BODY MASS INDEX (BMI) OF 31.0 TO 31.9 IN ADULT: Chronic | Status: RESOLVED | Noted: 2020-10-19 | Resolved: 2023-12-20

## 2023-12-20 PROBLEM — E66.811 CLASS 1 OBESITY DUE TO EXCESS CALORIES WITH SERIOUS COMORBIDITY AND BODY MASS INDEX (BMI) OF 31.0 TO 31.9 IN ADULT: Chronic | Status: RESOLVED | Noted: 2020-10-19 | Resolved: 2023-12-20

## 2023-12-20 PROBLEM — I70.0 AORTIC ATHEROSCLEROSIS: Status: ACTIVE | Noted: 2023-12-20

## 2023-12-20 NOTE — TELEPHONE ENCOUNTER
Hello,       A Patient Assistance Application for Roseanne Gonzalez - MRN  [id number] was faxed to your office @ [phone]. Please have Pricilla Banegas NP review the application to ensure the prescription is correct. If correct, sign and fax the application back to the Pharmacy Patient Assistance Team @[phone].      If changes need to be made to this application, please let me know so corrections can be made, and the application will be faxed back to you for approval and signature.

## 2023-12-20 NOTE — TELEPHONE ENCOUNTER
Good morning, pt called and said that his toujeo and ozempic order were mailed to the [facility]. We have numerous time said and changed the address for Pricilla Zaratemaggiramona to come to the [facility]. She does not work in [facility]. Pt states she can't go over there to p/u meds because she is taking care of an ill friend plus it is too far away for her to drive over there    Please assist with this, Meds need to either send to [facility] or pt's home    Thanks

## 2023-12-26 ENCOUNTER — TELEPHONE (OUTPATIENT)
Dept: ENDOCRINOLOGY | Facility: CLINIC | Age: 78
End: 2023-12-26
Payer: MEDICARE

## 2023-12-26 NOTE — TELEPHONE ENCOUNTER
Pt requesting status on her Toujeo order. States she rec'd Ozempic package but not Toujeo. Please contact pt and advise. Thank you!

## 2023-12-26 NOTE — TELEPHONE ENCOUNTER
----- Message from Linda Pratt sent at 12/26/2023  2:01 PM CST -----  Regarding: Needs return call  Type: Needs Medical Advice  Who Called:  Roseanne Sosa Call Back Number: [phone]    Additional Information: Pt is needing assistance regarding her medication she is confused and concerned about the pricing and which medication she is needing exactly, requesting dani speak to Gretta please call to josé

## 2023-12-29 NOTE — TELEPHONE ENCOUNTER
The prescription portion of Ms. Gonzalez application was received from the providers office. The completed patient assistance application has been submitted to  [employer] for consideration of eligibility.

## 2024-01-05 ENCOUNTER — TELEPHONE (OUTPATIENT)
Dept: PHARMACY | Facility: CLINIC | Age: 79
End: 2024-01-05
Payer: MEDICARE

## 2024-01-05 NOTE — TELEPHONE ENCOUNTER
Hello,       A Patient Assistance Application for Roseanne Gonzalez - MRN  [id number] was faxed to your office @ [phone]. Please have Fiona Rausch NP review the application to ensure the prescription is correct. If correct, sign and fax the application back to the Pharmacy Patient Assistance Team @[phone].      If changes need to be made to this application, please let me know so corrections can be made, and the application will be faxed back to you for approval and signature.

## 2024-01-09 NOTE — TELEPHONE ENCOUNTER
The prescription portion of Ms. Gonzalez application was received from the providers office. The completed patient assistance application has been submitted to  [employer] for consideration of eligibility.

## 2024-01-10 ENCOUNTER — TELEPHONE (OUTPATIENT)
Dept: ENDOCRINOLOGY | Facility: CLINIC | Age: 79
End: 2024-01-10
Payer: MEDICARE

## 2024-01-10 DIAGNOSIS — Z79.4 TYPE 2 DIABETES MELLITUS WITH OTHER SPECIFIED COMPLICATION, WITH LONG-TERM CURRENT USE OF INSULIN: ICD-10-CM

## 2024-01-10 DIAGNOSIS — E11.69 TYPE 2 DIABETES MELLITUS WITH OTHER SPECIFIED COMPLICATION, WITH LONG-TERM CURRENT USE OF INSULIN: ICD-10-CM

## 2024-01-10 RX ORDER — INSULIN GLARGINE 300 U/ML
26 INJECTION, SOLUTION SUBCUTANEOUS NIGHTLY
Qty: 4.5 ML | Refills: 6 | Status: SHIPPED | OUTPATIENT
Start: 2024-01-10 | End: 2024-02-01 | Stop reason: SDUPTHER

## 2024-01-10 NOTE — TELEPHONE ENCOUNTER
Please see below message. I sent in Toujeo earlier today, per her request to [facility]. Please ask pharmacy what her copay is. Then let pt know. I am ok with giving her sample in [place] if we have sample there.     ----- Message from Morelia Collado sent at 1/10/2024 12:30 PM CST -----  Contact: pt [phone]  Type: Needs Medical Advice  Who Called:  Pt      Best Call Back Number: [phone]     Additional Information: Pt stated she received a call from [employer] telling her that she has been approved for rx assistance and her medication will arrive in 7-10days. Pt stated she is not sure if it is for her TOUJEO, but she can not wait 7-10 days for meds or she will be dead. Pt asking if office can calll her back about this. Thank you !

## 2024-01-10 NOTE — TELEPHONE ENCOUNTER
----- Message from Ame Carolina sent at 1/10/2024  8:00 AM CST -----  Contact: self  Type: RX Refill Request        Who Called: Patient   Refill or New Rx: refill  RX Name and Strength: insulin glargine, TOUJEO, (TOUJEO SOLOSTAR U-300 INSULIN) 300 unit/mL (1.5 mL) InPn pen  How is the patient currently taking it? (ex. 1XDay): as directed   Is this a 30 day or 90 day RX: as directed   Preferred Pharmacy with phone number:   [id number] - [place] [address] [facility] AT [facility] [address] [facility]  [facility] [address] [place] [id number]  Phone: [phone] Fax: [phone]  Local or Mail Order: local   Ordering Provider: TIFFANIE Banegas  Best Call Back Number: [phone]  Additional Information: Plz Refill for pt she is completely out. Pt states she was suppose to have this medication free of charge but the office never updated her so she can get it. Thanks

## 2024-01-10 NOTE — TELEPHONE ENCOUNTER
----- Message from Morelia Collado sent at 1/10/2024 12:30 PM CST -----  Contact: pt [phone]  Type: Needs Medical Advice  Who Called:  Pt     Best Call Back Number: [phone]    Additional Information: Pt stated she received a call from [facility] and me telling her that she has been approved for rx assistance and her medication will arrive in 7-10days. Pt stated she is not sure if it is for her TOUJEO, but she can not wait 7-10 days for meds or she will be dead. Pt asking if office can calll her back about this. Thank you !

## 2024-01-10 NOTE — TELEPHONE ENCOUNTER
Per chart, patient's Toujeo application was submitted to [employer] for review in December. Hopefully she will receive notice soon as to whether or not she was approved for the assistance. This process does take time. I sent it to the pharmacy in the meantime.

## 2024-01-11 ENCOUNTER — TELEPHONE (OUTPATIENT)
Dept: NEUROLOGY | Facility: CLINIC | Age: 79
End: 2024-01-11
Payer: MEDICARE

## 2024-01-11 NOTE — TELEPHONE ENCOUNTER
S/w pt and notified her that we can give her a toujeo max pen while she waits for hers to come from PAP. Pt will p/u today around 1pm

## 2024-01-17 DIAGNOSIS — E11.69 TYPE 2 DIABETES MELLITUS WITH OTHER SPECIFIED COMPLICATION, WITH LONG-TERM CURRENT USE OF INSULIN: ICD-10-CM

## 2024-01-17 DIAGNOSIS — Z79.4 TYPE 2 DIABETES MELLITUS WITH OTHER SPECIFIED COMPLICATION, WITH LONG-TERM CURRENT USE OF INSULIN: ICD-10-CM

## 2024-01-17 RX ORDER — SEMAGLUTIDE 0.68 MG/ML
INJECTION, SOLUTION SUBCUTANEOUS
Qty: 5 EACH | Refills: 0 | Status: SHIPPED | OUTPATIENT
Start: 2024-01-17 | End: 2024-04-16 | Stop reason: SDUPTHER

## 2024-01-22 RX ORDER — ROSUVASTATIN CALCIUM 20 MG/1
TABLET, COATED ORAL
Qty: 90 TABLET | Refills: 3 | Status: SHIPPED | OUTPATIENT
Start: 2024-01-22

## 2024-01-22 RX ORDER — LOSARTAN POTASSIUM AND HYDROCHLOROTHIAZIDE 12.5; 5 MG/1; MG/1
TABLET ORAL
Qty: 90 TABLET | Refills: 3 | Status: SHIPPED | OUTPATIENT
Start: 2024-01-22 | End: 2024-02-02 | Stop reason: SDUPTHER

## 2024-01-24 ENCOUNTER — TELEPHONE (OUTPATIENT)
Dept: NEUROLOGY | Facility: CLINIC | Age: 79
End: 2024-01-24
Payer: MEDICARE

## 2024-01-24 ENCOUNTER — TELEPHONE (OUTPATIENT)
Dept: ENDOCRINOLOGY | Facility: CLINIC | Age: 79
End: 2024-01-24
Payer: MEDICARE

## 2024-01-24 NOTE — TELEPHONE ENCOUNTER
S/w pt states she got COVID on 1/5/24 and she was given meds for it as well as farxiga.Pt states she took first 2 doses of the ozempic 0.25 and she had severe stomach pain for 2 weeks. PCP sent her to see Fiona Rausch and she recommended her to stop the ozempic.Pt states she has no pain anymore but she needs to get w/Fiona again to let her know she is feeling better to see if she can go back to the ozempic.Pt states if she is back on the ozempic,how many more weeks should she do?shouls she start week 3 or restart on week one?

## 2024-01-24 NOTE — TELEPHONE ENCOUNTER
Regla Tavares. [employer] paperwork was faxed back to your office, along with the Ozempic application, on 11/10. Patient still hasn't received Toujeo. Could you please look into this and provide patient with update. Please let me know, as well. Thank you!

## 2024-01-25 ENCOUNTER — TELEPHONE (OUTPATIENT)
Dept: NEUROLOGY | Facility: CLINIC | Age: 79
End: 2024-01-25
Payer: MEDICARE

## 2024-01-25 NOTE — TELEPHONE ENCOUNTER
Spoke with the pt, she declined to schedule at the moment.  She is having vertigo and dizziness. She will f/o with Cardiology.  Encouraged her to also see ENT.

## 2024-01-26 ENCOUNTER — TELEPHONE (OUTPATIENT)
Dept: ENDOCRINOLOGY | Facility: CLINIC | Age: 79
End: 2024-01-26
Payer: MEDICARE

## 2024-01-26 NOTE — TELEPHONE ENCOUNTER
----- Message from Niko Bills LPN sent at 1/25/2024  3:02 PM CST -----  Regarding: FW: Needs return call    ----- Message -----  From: Linda Pratt  Sent: 1/25/2024  12:45 PM CST  To: Bassam Pleitez Staff  Subject: Needs return call                                Type: Needs Medical Advice  Who Called:  [facility] Call Back Number:   Additional Information: They are needing a call back regarding this patient, that is all she said, please priscilla

## 2024-01-26 NOTE — TELEPHONE ENCOUNTER
S/w [employer] pt connection and they said the pt is missing proof of income.Lm for pt to call them and see if they need anything else.    Proof of income can be mail to them or fax to [phone]

## 2024-02-01 DIAGNOSIS — E11.69 TYPE 2 DIABETES MELLITUS WITH OTHER SPECIFIED COMPLICATION, WITH LONG-TERM CURRENT USE OF INSULIN: ICD-10-CM

## 2024-02-01 DIAGNOSIS — Z79.4 TYPE 2 DIABETES MELLITUS WITH OTHER SPECIFIED COMPLICATION, WITH LONG-TERM CURRENT USE OF INSULIN: ICD-10-CM

## 2024-02-01 RX ORDER — INSULIN GLARGINE 300 U/ML
26 INJECTION, SOLUTION SUBCUTANEOUS NIGHTLY
Qty: 6 PEN | Refills: 0 | Status: SHIPPED | OUTPATIENT
Start: 2024-02-01 | End: 2024-02-27 | Stop reason: SDUPTHER

## 2024-02-07 NOTE — TELEPHONE ENCOUNTER
We are pleased to inform you Roseanne Gonzalez has been approved in the [employer] Patient Assistance Program.  [employer] has shared this information with your patient.   Approval Details  Eligibility start Date: 2024  Eligibility end date: 2024(Updated proof of eligibility required to re-enroll for 2025).  Approved Medication: Toujeo  Day supply: 90   Allotted Refills: 3 (Please be advised Program allows only 3 refills per eligibility period regardless of changes in strength).   Estimated Shippin -7 business days.  Shipping Location: [facility] (You and your patient will receive further communication from an [facility] Rep once Medication is received)                                            Important Note  It is imperative that any changes to strength or discontinuation of this medication be referred to the Pharmacy Patient Assistance Team Pool Via [employer] to prevent Gaps in therapy.

## 2024-03-05 ENCOUNTER — LAB VISIT (OUTPATIENT)
Dept: LAB | Facility: HOSPITAL | Age: 79
End: 2024-03-05
Attending: NURSE PRACTITIONER
Payer: MEDICARE

## 2024-03-05 DIAGNOSIS — E11.69 TYPE 2 DIABETES MELLITUS WITH OTHER SPECIFIED COMPLICATION, WITH LONG-TERM CURRENT USE OF INSULIN: ICD-10-CM

## 2024-03-05 DIAGNOSIS — Z79.4 TYPE 2 DIABETES MELLITUS WITH OTHER SPECIFIED COMPLICATION, WITH LONG-TERM CURRENT USE OF INSULIN: ICD-10-CM

## 2024-03-05 LAB
ANION GAP SERPL CALC-SCNC: 10 MMOL/L (ref 8–16)
BUN SERPL-MCNC: 19 MG/DL (ref 8–23)
CALCIUM SERPL-MCNC: 9.3 MG/DL (ref 8.7–10.5)
CHLORIDE SERPL-SCNC: 106 MMOL/L (ref 95–110)
CHOLEST SERPL-MCNC: 113 MG/DL (ref 120–199)
CHOLEST/HDLC SERPL: 2.1 {RATIO} (ref 2–5)
CO2 SERPL-SCNC: 24 MMOL/L (ref 23–29)
CREAT SERPL-MCNC: 0.9 MG/DL (ref 0.5–1.4)
EST. GFR  (NO RACE VARIABLE): >60 ML/MIN/1.73 M^2
ESTIMATED AVG GLUCOSE: 137 MG/DL (ref 68–131)
GLUCOSE SERPL-MCNC: 93 MG/DL (ref 70–110)
HBA1C MFR BLD: 6.4 % (ref 4–5.6)
HDLC SERPL-MCNC: 53 MG/DL (ref 40–75)
HDLC SERPL: 46.9 % (ref 20–50)
LDLC SERPL CALC-MCNC: 50.6 MG/DL (ref 63–159)
NONHDLC SERPL-MCNC: 60 MG/DL
POTASSIUM SERPL-SCNC: 4.2 MMOL/L (ref 3.5–5.1)
SODIUM SERPL-SCNC: 140 MMOL/L (ref 136–145)
TRIGL SERPL-MCNC: 47 MG/DL (ref 30–150)

## 2024-03-05 PROCEDURE — 80048 BASIC METABOLIC PNL TOTAL CA: CPT | Performed by: NURSE PRACTITIONER

## 2024-03-05 PROCEDURE — 83036 HEMOGLOBIN GLYCOSYLATED A1C: CPT | Performed by: NURSE PRACTITIONER

## 2024-03-05 PROCEDURE — 80061 LIPID PANEL: CPT | Performed by: NURSE PRACTITIONER

## 2024-03-05 PROCEDURE — 36415 COLL VENOUS BLD VENIPUNCTURE: CPT | Mod: PN | Performed by: NURSE PRACTITIONER

## 2024-03-12 ENCOUNTER — OFFICE VISIT (OUTPATIENT)
Dept: ENDOCRINOLOGY | Facility: CLINIC | Age: 79
End: 2024-03-12
Payer: MEDICARE

## 2024-03-12 VITALS
HEIGHT: 64 IN | WEIGHT: 154 LBS | HEART RATE: 54 BPM | DIASTOLIC BLOOD PRESSURE: 60 MMHG | BODY MASS INDEX: 26.29 KG/M2 | SYSTOLIC BLOOD PRESSURE: 100 MMHG

## 2024-03-12 DIAGNOSIS — Z79.4 TYPE 2 DIABETES MELLITUS WITH OTHER SPECIFIED COMPLICATION, WITH LONG-TERM CURRENT USE OF INSULIN: Primary | ICD-10-CM

## 2024-03-12 DIAGNOSIS — E78.5 HYPERLIPIDEMIA, UNSPECIFIED HYPERLIPIDEMIA TYPE: ICD-10-CM

## 2024-03-12 DIAGNOSIS — E11.69 TYPE 2 DIABETES MELLITUS WITH OTHER SPECIFIED COMPLICATION, WITH LONG-TERM CURRENT USE OF INSULIN: Primary | ICD-10-CM

## 2024-03-12 DIAGNOSIS — I25.10 CORONARY ARTERY DISEASE INVOLVING NATIVE CORONARY ARTERY OF NATIVE HEART WITHOUT ANGINA PECTORIS: ICD-10-CM

## 2024-03-12 DIAGNOSIS — E05.90 HYPERTHYROIDISM: ICD-10-CM

## 2024-03-12 DIAGNOSIS — I10 PRIMARY HYPERTENSION: ICD-10-CM

## 2024-03-12 PROCEDURE — 3078F DIAST BP <80 MM HG: CPT | Mod: CPTII,S$GLB,, | Performed by: NURSE PRACTITIONER

## 2024-03-12 PROCEDURE — 3288F FALL RISK ASSESSMENT DOCD: CPT | Mod: CPTII,S$GLB,, | Performed by: NURSE PRACTITIONER

## 2024-03-12 PROCEDURE — 3074F SYST BP LT 130 MM HG: CPT | Mod: CPTII,S$GLB,, | Performed by: NURSE PRACTITIONER

## 2024-03-12 PROCEDURE — 1159F MED LIST DOCD IN RCRD: CPT | Mod: CPTII,S$GLB,, | Performed by: NURSE PRACTITIONER

## 2024-03-12 PROCEDURE — 1125F AMNT PAIN NOTED PAIN PRSNT: CPT | Mod: CPTII,S$GLB,, | Performed by: NURSE PRACTITIONER

## 2024-03-12 PROCEDURE — 99999 PR PBB SHADOW E&M-EST. PATIENT-LVL IV: CPT | Mod: PBBFAC,,, | Performed by: NURSE PRACTITIONER

## 2024-03-12 PROCEDURE — 1101F PT FALLS ASSESS-DOCD LE1/YR: CPT | Mod: CPTII,S$GLB,, | Performed by: NURSE PRACTITIONER

## 2024-03-12 PROCEDURE — 99214 OFFICE O/P EST MOD 30 MIN: CPT | Mod: S$GLB,,, | Performed by: NURSE PRACTITIONER

## 2024-03-12 PROCEDURE — 1160F RVW MEDS BY RX/DR IN RCRD: CPT | Mod: CPTII,S$GLB,, | Performed by: NURSE PRACTITIONER

## 2024-03-12 RX ORDER — INSULIN GLARGINE 300 [IU]/ML
20 INJECTION, SOLUTION SUBCUTANEOUS DAILY
Qty: 1 PEN | Refills: 0
Start: 2024-03-12 | End: 2024-05-09

## 2024-03-12 NOTE — PROGRESS NOTES
CC: Ms. Roseanne Gonzalez arrives today for management of Type 2 DM and review of chronic medical conditions, as listed in the Visit Diagnosis section of this encounter.       HPI: Ms. Roseanne Gonzalez was diagnosed with Type 2 DM in . She was diagnosed based on lab work following CABG. Initial treatment consisted of metformin. Insulin added about 10 years after diagnosis. Denies FH of DM. Denies hospitalizations due to DM.   She has diagnosis of CAD with past CABG. Follows with cardiology.   She also has hyperthyroidism and takes methimazole.     Patient was initially seen by me in October. At this time, Trulicity was changed to Ozempic and she was referred to Pt Assistance team for Ozempic, Toujeo PAP.     She started Ozempic in January but is still taking introductory dose. Her GI doctor recently prescribed Bentyl for abd cramps.     BG monitoring per [employer] Stephane. She forgot her  at home today. Reports the following:  Fastin-90  Lunch/dinner: 150s    Hypoglycemia: rare   Hypoglycemic Symptoms:  fatigue  Hypoglycemia Treatment: 1/2 candy bar, PB    Missing Insulin/PO medication doses: No    Exercise: Yes - goes to gym class at [employer] 3 days/week     Dietary Habits: Eats 3 meals/day. Avoids sugary beverages.    Last DM education appointment: 2021      CURRENT DIABETIC MEDS:  Farxiga 5 mg daily, Ozempic 0.25 mg weekly, Toujeo Max 24 units QHS  -- receives all from PAP  Vial or pen: pen  Glucometer type:     Previous DM treatments:  Metformin - diarrhea   Januvia - rash?  Trulicity     Last Eye Exam: 2023, no DR. Dr. Caraballo (@ Dr. Boo's office)  Last Podiatry Exam: n/a    REVIEW OF SYSTEMS  Constitutional: no c/o fatigue, weakness. +6# weight loss   Cardiac: no palpitations or chest pain.  Respiratory: no cough or dyspnea.  GI: no c/o abdominal pain. Denies h/o pancreatitis. + mild nausea, occasional abd cramping   Skin: no lesions or rashes.   Neuro: no numbness, tingling, or  "parasthesias.  Endocrine: denies polyphagia, polydipsia, polyuria      Personally reviewed Past Medical, Surgical, Social History.    Vital Signs  /60   Pulse (!) 54   Ht 5' 4" (1.626 m)   Wt 69.8 kg (153 lb 15.9 oz)   BMI 26.43 kg/m²     Personally reviewed the below labs:    Hemoglobin A1C   Date Value Ref Range Status   03/05/2024 6.4 (H) 4.0 - 5.6 % Final     Comment:     ADA Screening Guidelines:  5.7-6.4%  Consistent with prediabetes  >or=6.5%  Consistent with diabetes    High levels of fetal hemoglobin interfere with the HbA1C  assay. Heterozygous hemoglobin variants (HbS, HgC, etc)do  not significantly interfere with this assay.   However, presence of multiple variants may affect accuracy.     10/26/2023 6.2 (H) 4.0 - 5.6 % Final     Comment:     ADA Screening Guidelines:  5.7-6.4%  Consistent with prediabetes  >or=6.5%  Consistent with diabetes    High levels of fetal hemoglobin interfere with the HbA1C  assay. Heterozygous hemoglobin variants (HbS, HgC, etc)do  not significantly interfere with this assay.   However, presence of multiple variants may affect accuracy.     10/07/2020 8.0 (H) 4.8 - 5.6 % Final     Comment:              Prediabetes: 5.7 - 6.4           Diabetes: >6.4           Glycemic control for adults with diabetes: <7.0       Hemoglobin A1c   Date Value Ref Range Status   11/17/2022 6.5 (H) 4.8 - 5.6 % Final     Comment:              Prediabetes: 5.7 - 6.4           Diabetes: >6.4           Glycemic control for adults with diabetes: <7.0     09/12/2022 6.8 (H) 4.8 - 5.6 % Final     Comment:              Prediabetes: 5.7 - 6.4           Diabetes: >6.4           Glycemic control for adults with diabetes: <7.0     06/07/2022 8.5 (H) 4.8 - 5.6 % Final     Comment:              Prediabetes: 5.7 - 6.4           Diabetes: >6.4           Glycemic control for adults with diabetes: <7.0         Chemistry        Component Value Date/Time     03/05/2024 0857    K 4.2 03/05/2024 0857    "  03/05/2024 0857    CO2 24 03/05/2024 0857    BUN 19 03/05/2024 0857    CREATININE 0.9 03/05/2024 0857    GLU 93 03/05/2024 0857        Component Value Date/Time    CALCIUM 9.3 03/05/2024 0857    ALKPHOS 45 08/28/2023 1707    AST 30 08/28/2023 1707    ALT 22 08/28/2023 1707    BILITOT 0.6 08/28/2023 1707    ESTGFRAFRICA >60.0 04/30/2021 0758    EGFRNONAA 69 02/17/2022 0829          Lab Results   Component Value Date    CHOL 113 (L) 03/05/2024    CHOL 124 11/17/2022    CHOL 173 06/07/2022     Lab Results   Component Value Date    HDL 53 03/05/2024    HDL 52 11/17/2022    HDL 54 06/07/2022     Lab Results   Component Value Date    LDLCALC 50.6 (L) 03/05/2024    LDLCALC 58 11/17/2022    LDLCALC 100 (H) 06/07/2022     Lab Results   Component Value Date    TRIG 47 03/05/2024    TRIG 66 11/17/2022    TRIG 106 06/07/2022     Lab Results   Component Value Date    CHOLHDL 46.9 03/05/2024    CHOLHDL 28.4 04/30/2021    CHOLHDL 2.9 06/11/2020       Lab Results   Component Value Date    MICALBCREAT 7.3 12/22/2023     Lab Results   Component Value Date    TSH 1.110 05/16/2023       Estimated Creatinine Clearance: 51 mL/min (based on SCr of 0.9 mg/dL).    Vit D, 25-Hydroxy   Date Value Ref Range Status   11/06/2017 41 30 - 96 ng/mL Final     Comment:     Vitamin D deficiency.........<10 ng/mL                              Vitamin D insufficiency......10-29 ng/mL       Vitamin D sufficiency........> or equal to 30 ng/mL  Vitamin D toxicity............>100 ng/mL           PHYSICAL EXAMINATION  Constitutional: Appears well, no distress  Respiratory: CTA, even and unlabored.  Cardiovascular: RRR, no murmurs, no carotid bruits.   GI: bowel sounds active, no hernia noted  Skin: warm and dry; no visible wounds  Neuro: oriented to person, place, time  Feet: appropriate footwear.      FREESTYLE FILIBERTO DOWNLOAD: didn't bring  today         Goals        HEMOGLOBIN A1C < 7               Assessment/Plan  1. Type 2 diabetes mellitus  with other specified complication, with long-term current use of insulin  -- Controlled. However, is having some mild nausea with Ozempic introduction dose.   -- decrease Toujeo Max to 20 units.   -- trial increasing Ozempic to 0.5 mg, which is the therapeutic dose. I advised her to notify me if nausea worsens.   -- continue Farxiga  -- continue Freestyle Stephane. Bring  to follow ups    -- Discussed diagnosis of DM, A1c goals, progression of disease, long term complications and tx options.    -- Reviewed hypoglycemia management: treat with 4 oz of juice, 4 oz regular soda, or 4 glucose tablets. Monitor and repeat treatment every 15 minutes until BG is >70 Then have a snack, which includes 15 grams of complex carbohydrates and protein.   Advised patient to check BG before activities, such as driving or exercise.    -- takes statin, aspirin, ARB   2. Coronary artery disease involving native coronary artery of native heart without angina pectoris  -- follows with cardiology  -- avoid hypoglycemia   3. Primary hypertension  -- controlled  -- on ARB   4. Hyperthyroidism  -- stable  -- continue methimazole (takes M,W,F)  -- TSH with RTC   5. Hyperlipidemia, unspecified hyperlipidemia type  -- controlled  -- continue Crestor       FOLLOW UP  Follow up in about 4 months (around 7/12/2024).   Patient instructed to bring BG logs to each follow up   Patient encouraged to call for any BG/medication issues, concerns, or questions.    Orders Placed This Encounter   Procedures    Hemoglobin A1C    TSH    Comprehensive Metabolic Panel

## 2024-04-16 DIAGNOSIS — E11.69 TYPE 2 DIABETES MELLITUS WITH OTHER SPECIFIED COMPLICATION, WITH LONG-TERM CURRENT USE OF INSULIN: ICD-10-CM

## 2024-04-16 DIAGNOSIS — Z79.4 TYPE 2 DIABETES MELLITUS WITH OTHER SPECIFIED COMPLICATION, WITH LONG-TERM CURRENT USE OF INSULIN: ICD-10-CM

## 2024-04-16 RX ORDER — SEMAGLUTIDE 0.68 MG/ML
INJECTION, SOLUTION SUBCUTANEOUS
Qty: 5 EACH | Refills: 0 | Status: SHIPPED | OUTPATIENT
Start: 2024-04-16

## 2024-05-07 ENCOUNTER — TELEPHONE (OUTPATIENT)
Dept: ENDOCRINOLOGY | Facility: CLINIC | Age: 79
End: 2024-05-07
Payer: MEDICARE

## 2024-05-07 NOTE — TELEPHONE ENCOUNTER
----- Message from Salena Quach sent at 5/7/2024 10:45 AM CDT -----  Type:  Needs Medical Advice    Who Called:  Pt    insulin glargine U-300 conc (TOUJEO MAX SOLOSTAR) 300 unit/mL (3 mL) insulin pen    Would the patient rather a call back or a response via [facility]?  Call back    Best Call Back Number:  [phone]    Additional Information:  Pt is calling to see if medication was signed off on. She got a call a week ago that the shipment should be sent out shortly. She still has not received it and is almost out.   Please call back to advise. Thanks!

## 2024-05-09 ENCOUNTER — TELEPHONE (OUTPATIENT)
Dept: ENDOCRINOLOGY | Facility: CLINIC | Age: 79
End: 2024-05-09
Payer: MEDICARE

## 2024-05-09 DIAGNOSIS — Z79.4 TYPE 2 DIABETES MELLITUS WITH OTHER SPECIFIED COMPLICATION, WITH LONG-TERM CURRENT USE OF INSULIN: Primary | ICD-10-CM

## 2024-05-09 DIAGNOSIS — E11.69 TYPE 2 DIABETES MELLITUS WITH OTHER SPECIFIED COMPLICATION, WITH LONG-TERM CURRENT USE OF INSULIN: Primary | ICD-10-CM

## 2024-05-09 RX ORDER — INSULIN GLARGINE 300 U/ML
20 INJECTION, SOLUTION SUBCUTANEOUS DAILY
Qty: 6 PEN | Refills: 0 | Status: SHIPPED | OUTPATIENT
Start: 2024-05-09

## 2024-05-09 NOTE — TELEPHONE ENCOUNTER
Good morning    Received a call from pt asking about her toujeo.States she has not received it yet We have not received any orders    thanks

## 2024-05-31 PROBLEM — E11.59 TYPE 2 DIABETES MELLITUS WITH CIRCULATORY DISORDER, WITHOUT LONG-TERM CURRENT USE OF INSULIN: Chronic | Status: ACTIVE | Noted: 2024-05-31

## 2024-05-31 PROBLEM — E11.59 HYPERTENSION ASSOCIATED WITH DIABETES: Chronic | Status: ACTIVE | Noted: 2024-05-31

## 2024-05-31 PROBLEM — I70.0 AORTIC ATHEROSCLEROSIS: Chronic | Status: ACTIVE | Noted: 2023-12-20

## 2024-05-31 PROBLEM — E11.59 TYPE 2 DIABETES MELLITUS WITH CIRCULATORY DISORDER, WITHOUT LONG-TERM CURRENT USE OF INSULIN: Status: ACTIVE | Noted: 2024-05-31

## 2024-05-31 PROBLEM — I15.2 HYPERTENSION ASSOCIATED WITH DIABETES: Chronic | Status: ACTIVE | Noted: 2024-05-31

## 2024-06-04 ENCOUNTER — TELEPHONE (OUTPATIENT)
Dept: ENDOCRINOLOGY | Facility: CLINIC | Age: 79
End: 2024-06-04
Payer: MEDICARE

## 2024-06-04 NOTE — TELEPHONE ENCOUNTER
----- Message from Ly Macydamián sent at 6/4/2024  8:47 AM CDT -----  Contact: self  Pt needs a copy of her last A1C results done on 3/5/24 for her eye doctor.  She has an appt on 6/24/24 with her eye doctor so she would like to  a copy this coming Thursday at [facility], please call pt back at [phone] and please put it in evelope with her name on it.  thanks

## 2024-06-22 PROBLEM — Z79.899 ON STATIN THERAPY: Chronic | Status: ACTIVE | Noted: 2020-10-19

## 2024-07-18 ENCOUNTER — LAB VISIT (OUTPATIENT)
Dept: LAB | Facility: HOSPITAL | Age: 79
End: 2024-07-18
Attending: INTERNAL MEDICINE
Payer: MEDICARE

## 2024-07-18 DIAGNOSIS — Z79.4 TYPE 2 DIABETES MELLITUS WITH OTHER SPECIFIED COMPLICATION, WITH LONG-TERM CURRENT USE OF INSULIN: ICD-10-CM

## 2024-07-18 DIAGNOSIS — E11.69 TYPE 2 DIABETES MELLITUS WITH OTHER SPECIFIED COMPLICATION, WITH LONG-TERM CURRENT USE OF INSULIN: ICD-10-CM

## 2024-07-18 LAB
ALBUMIN SERPL BCP-MCNC: 4 G/DL (ref 3.5–5.2)
ALP SERPL-CCNC: 41 U/L (ref 55–135)
ALT SERPL W/O P-5'-P-CCNC: 18 U/L (ref 10–44)
ANION GAP SERPL CALC-SCNC: 9 MMOL/L (ref 8–16)
AST SERPL-CCNC: 24 U/L (ref 10–40)
BILIRUB SERPL-MCNC: 0.6 MG/DL (ref 0.1–1)
BUN SERPL-MCNC: 22 MG/DL (ref 8–23)
CALCIUM SERPL-MCNC: 9.3 MG/DL (ref 8.7–10.5)
CHLORIDE SERPL-SCNC: 105 MMOL/L (ref 95–110)
CO2 SERPL-SCNC: 24 MMOL/L (ref 23–29)
CREAT SERPL-MCNC: 1 MG/DL (ref 0.5–1.4)
EST. GFR  (NO RACE VARIABLE): 58 ML/MIN/1.73 M^2
ESTIMATED AVG GLUCOSE: 134 MG/DL (ref 68–131)
GLUCOSE SERPL-MCNC: 88 MG/DL (ref 70–110)
HBA1C MFR BLD: 6.3 % (ref 4–5.6)
POTASSIUM SERPL-SCNC: 4.2 MMOL/L (ref 3.5–5.1)
PROT SERPL-MCNC: 6.2 G/DL (ref 6–8.4)
SODIUM SERPL-SCNC: 138 MMOL/L (ref 136–145)
TSH SERPL DL<=0.005 MIU/L-ACNC: 2.36 UIU/ML (ref 0.4–4)

## 2024-07-18 PROCEDURE — 36415 COLL VENOUS BLD VENIPUNCTURE: CPT | Mod: PN | Performed by: NURSE PRACTITIONER

## 2024-07-18 PROCEDURE — 80053 COMPREHEN METABOLIC PANEL: CPT | Performed by: NURSE PRACTITIONER

## 2024-07-18 PROCEDURE — 83036 HEMOGLOBIN GLYCOSYLATED A1C: CPT | Performed by: NURSE PRACTITIONER

## 2024-07-18 PROCEDURE — 84443 ASSAY THYROID STIM HORMONE: CPT | Performed by: NURSE PRACTITIONER

## 2024-07-19 RX ORDER — CARVEDILOL 3.12 MG/1
TABLET ORAL
Qty: 180 TABLET | Refills: 4 | Status: SHIPPED | OUTPATIENT
Start: 2024-07-19

## 2024-07-23 DIAGNOSIS — E11.69 TYPE 2 DIABETES MELLITUS WITH OTHER SPECIFIED COMPLICATION, WITH LONG-TERM CURRENT USE OF INSULIN: ICD-10-CM

## 2024-07-23 DIAGNOSIS — Z79.4 TYPE 2 DIABETES MELLITUS WITH OTHER SPECIFIED COMPLICATION, WITH LONG-TERM CURRENT USE OF INSULIN: ICD-10-CM

## 2024-07-23 RX ORDER — SEMAGLUTIDE 0.68 MG/ML
INJECTION, SOLUTION SUBCUTANEOUS
Qty: 5 EACH | Refills: 0 | Status: SHIPPED | OUTPATIENT
Start: 2024-07-23

## 2024-07-25 ENCOUNTER — TELEPHONE (OUTPATIENT)
Dept: ENDOCRINOLOGY | Facility: CLINIC | Age: 79
End: 2024-07-25

## 2024-07-25 ENCOUNTER — OFFICE VISIT (OUTPATIENT)
Dept: ENDOCRINOLOGY | Facility: CLINIC | Age: 79
End: 2024-07-25
Payer: MEDICARE

## 2024-07-25 VITALS
BODY MASS INDEX: 25.76 KG/M2 | HEART RATE: 54 BPM | WEIGHT: 150.88 LBS | DIASTOLIC BLOOD PRESSURE: 60 MMHG | SYSTOLIC BLOOD PRESSURE: 100 MMHG | HEIGHT: 64 IN

## 2024-07-25 DIAGNOSIS — Z79.4 TYPE 2 DIABETES MELLITUS WITH OTHER SPECIFIED COMPLICATION, WITH LONG-TERM CURRENT USE OF INSULIN: Primary | ICD-10-CM

## 2024-07-25 DIAGNOSIS — E78.5 HYPERLIPIDEMIA, UNSPECIFIED HYPERLIPIDEMIA TYPE: ICD-10-CM

## 2024-07-25 DIAGNOSIS — I10 PRIMARY HYPERTENSION: ICD-10-CM

## 2024-07-25 DIAGNOSIS — E05.90 HYPERTHYROIDISM: ICD-10-CM

## 2024-07-25 DIAGNOSIS — I25.10 CORONARY ARTERY DISEASE INVOLVING NATIVE CORONARY ARTERY OF NATIVE HEART WITHOUT ANGINA PECTORIS: ICD-10-CM

## 2024-07-25 DIAGNOSIS — E11.69 TYPE 2 DIABETES MELLITUS WITH OTHER SPECIFIED COMPLICATION, WITH LONG-TERM CURRENT USE OF INSULIN: Primary | ICD-10-CM

## 2024-07-25 PROCEDURE — 99214 OFFICE O/P EST MOD 30 MIN: CPT | Mod: S$GLB,,, | Performed by: NURSE PRACTITIONER

## 2024-07-25 PROCEDURE — G2211 COMPLEX E/M VISIT ADD ON: HCPCS | Mod: S$GLB,,, | Performed by: NURSE PRACTITIONER

## 2024-07-25 PROCEDURE — 99999 PR PBB SHADOW E&M-EST. PATIENT-LVL IV: CPT | Mod: PBBFAC,,, | Performed by: NURSE PRACTITIONER

## 2024-07-25 PROCEDURE — 3288F FALL RISK ASSESSMENT DOCD: CPT | Mod: CPTII,S$GLB,, | Performed by: NURSE PRACTITIONER

## 2024-07-25 PROCEDURE — 3074F SYST BP LT 130 MM HG: CPT | Mod: CPTII,S$GLB,, | Performed by: NURSE PRACTITIONER

## 2024-07-25 PROCEDURE — 1126F AMNT PAIN NOTED NONE PRSNT: CPT | Mod: CPTII,S$GLB,, | Performed by: NURSE PRACTITIONER

## 2024-07-25 PROCEDURE — 1101F PT FALLS ASSESS-DOCD LE1/YR: CPT | Mod: CPTII,S$GLB,, | Performed by: NURSE PRACTITIONER

## 2024-07-25 PROCEDURE — 1159F MED LIST DOCD IN RCRD: CPT | Mod: CPTII,S$GLB,, | Performed by: NURSE PRACTITIONER

## 2024-07-25 PROCEDURE — 1160F RVW MEDS BY RX/DR IN RCRD: CPT | Mod: CPTII,S$GLB,, | Performed by: NURSE PRACTITIONER

## 2024-07-25 PROCEDURE — 3078F DIAST BP <80 MM HG: CPT | Mod: CPTII,S$GLB,, | Performed by: NURSE PRACTITIONER

## 2024-07-25 NOTE — TELEPHONE ENCOUNTER
HiPricilla saw this pt today and she is needing refills on her farxiga,ozempic and toujeo.Can you please reach out to her   Thanks

## 2024-07-25 NOTE — PROGRESS NOTES
CC: Ms. Roseanne Gonzalez arrives today for management of Type 2 DM and review of chronic medical conditions, as listed in the Visit Diagnosis section of this encounter.       HPI: Ms. Roseanne Gonzalez was diagnosed with Type 2 DM in . She was diagnosed based on lab work following CABG. Initial treatment consisted of metformin. Insulin added about 10 years after diagnosis. Denies FH of DM. Denies hospitalizations due to DM.   She has diagnosis of CAD with past CABG. Follows with cardiology.   She also has hyperthyroidism and takes methimazole.     Patient was initially seen by me in March, Toujeo Max dose was decreased and Ozempic dose was increased.    She states that she is feeling well.    BG monitoring per [employer]style Stephane. She forgot her  at home today. Reports the following:  Fastin-130  Lunch/dinner: 140-150    Hypoglycemia: denies  Hypoglycemic Symptoms:  fatigue  Hypoglycemia Treatment: 1/2 candy bar, PB    Missing Insulin/PO medication doses: No    Exercise: Yes - goes to gym class at [employer] 3 days/week     Dietary Habits: Eats 3 meals/day. May snack on Kind bar. Avoids sugary beverages.    Last DM education appointment: 2021    Her son has stage 4 head and neck cancer and has not been handling the situation well. Has stopped chemo. She has good support system.      CURRENT DIABETIC MEDS:  Farxiga 5 mg daily, Ozempic 0.5 mg weekly, Toujeo Max 20 units QHS  -- receives all from PAP  Vial or pen: pen  Glucometer type:     Previous DM treatments:  Metformin - diarrhea   Januvia - rash?  Trulicity     Last Eye Exam: 2024, no DR. Dr. Caraballo (@ Dr. Boo's office)  Last Podiatry Exam: n/a    REVIEW OF SYSTEMS  Constitutional: no c/o fatigue, weakness. +3# weight loss   Cardiac: no palpitations or chest pain.  Respiratory: no cough or dyspnea.  GI: no c/o nausea, abdominal pain. Denies h/o pancreatitis. + mild constipation. Takes probiotic and PRN stool softener with relief.  "  Skin: no lesions or rashes.   Neuro: no tingling, or parasthesias. + intermittent numbness in L foot only. Does report L sided DDD of spine.  Endocrine: denies polyphagia, polydipsia, polyuria      Personally reviewed Past Medical, Surgical, Social History.    Vital Signs  /60   Pulse (!) 54   Ht 5' 4" (1.626 m)   Wt 68.5 kg (150 lb 14.5 oz)   BMI 25.90 kg/m²     Personally reviewed the below labs:    Hemoglobin A1C   Date Value Ref Range Status   07/18/2024 6.3 (H) 4.0 - 5.6 % Final     Comment:     ADA Screening Guidelines:  5.7-6.4%  Consistent with prediabetes  >or=6.5%  Consistent with diabetes    High levels of fetal hemoglobin interfere with the HbA1C  assay. Heterozygous hemoglobin variants (HbS, HgC, etc)do  not significantly interfere with this assay.   However, presence of multiple variants may affect accuracy.     03/05/2024 6.4 (H) 4.0 - 5.6 % Final     Comment:     ADA Screening Guidelines:  5.7-6.4%  Consistent with prediabetes  >or=6.5%  Consistent with diabetes    High levels of fetal hemoglobin interfere with the HbA1C  assay. Heterozygous hemoglobin variants (HbS, HgC, etc)do  not significantly interfere with this assay.   However, presence of multiple variants may affect accuracy.     10/26/2023 6.2 (H) 4.0 - 5.6 % Final     Comment:     ADA Screening Guidelines:  5.7-6.4%  Consistent with prediabetes  >or=6.5%  Consistent with diabetes    High levels of fetal hemoglobin interfere with the HbA1C  assay. Heterozygous hemoglobin variants (HbS, HgC, etc)do  not significantly interfere with this assay.   However, presence of multiple variants may affect accuracy.         Chemistry        Component Value Date/Time     07/18/2024 0900    K 4.2 07/18/2024 0900     07/18/2024 0900    CO2 24 07/18/2024 0900    BUN 22 07/18/2024 0900    CREATININE 1.0 07/18/2024 0900    GLU 88 07/18/2024 0900        Component Value Date/Time    CALCIUM 9.3 07/18/2024 0900    ALKPHOS 41 (L) " 07/18/2024 0900    AST 24 07/18/2024 0900    ALT 18 07/18/2024 0900    BILITOT 0.6 07/18/2024 0900    ESTGFRAFRICA >60.0 04/30/2021 0758    EGFRNONAA 69 02/17/2022 0829          Lab Results   Component Value Date    CHOL 113 (L) 03/05/2024    CHOL 124 11/17/2022    CHOL 173 06/07/2022     Lab Results   Component Value Date    HDL 53 03/05/2024    HDL 52 11/17/2022    HDL 54 06/07/2022     Lab Results   Component Value Date    LDLCALC 50.6 (L) 03/05/2024    LDLCALC 58 11/17/2022    LDLCALC 100 (H) 06/07/2022     Lab Results   Component Value Date    TRIG 47 03/05/2024    TRIG 66 11/17/2022    TRIG 106 06/07/2022     Lab Results   Component Value Date    CHOLHDL 46.9 03/05/2024    CHOLHDL 28.4 04/30/2021    CHOLHDL 2.9 06/11/2020       Lab Results   Component Value Date    MICALBCREAT 4.1 07/18/2024     Lab Results   Component Value Date    TSH 2.357 07/18/2024       CrCl cannot be calculated (Patient's most recent lab result is older than the maximum 7 days allowed.).    Vit D, 25-Hydroxy   Date Value Ref Range Status   11/06/2017 41 30 - 96 ng/mL Final     Comment:     Vitamin D deficiency.........<10 ng/mL                              Vitamin D insufficiency......10-29 ng/mL       Vitamin D sufficiency........> or equal to 30 ng/mL  Vitamin D toxicity............>100 ng/mL           PHYSICAL EXAMINATION  Constitutional: Appears well, no distress  Respiratory: CTA, even and unlabored.  Cardiovascular: RRR, no murmurs, no carotid bruits.   GI: bowel sounds active, no hernia noted  Skin: warm and dry; no visible wounds  Neuro: oriented to person, place, time  Feet: appropriate footwear.      FREESTYLE FILIBERTO DOWNLOAD: didn't bring  today         Goals        HEMOGLOBIN A1C < 7               Assessment/Plan  1. Type 2 diabetes mellitus with other specified complication, with long-term current use of insulin  -- Well controlled.   -- continue Farxiga, Ozempic, Toujeo Max  -- continue Freestyle Filiberto. Advised her  to bring  to follow ups    -- Discussed diagnosis of DM, A1c goals, progression of disease, long term complications and tx options.    -- Reviewed hypoglycemia management: treat with 4 oz of juice, 4 oz regular soda, or 4 glucose tablets. Monitor and repeat treatment every 15 minutes until BG is >70 Then have a snack, which includes 15 grams of complex carbohydrates and protein.   Advised patient to check BG before activities, such as driving or exercise.    -- takes statin, aspirin, ARB   2. Coronary artery disease involving native coronary artery of native heart without angina pectoris  -- follows with cardiology  -- Ozempic may add benefit  -- avoid hypoglycemia   3. Primary hypertension  -- controlled  -- on ARB   4. Hyperthyroidism  -- stable  -- continue methimazole (takes M,W,F)   5. Hyperlipidemia, unspecified hyperlipidemia type  -- controlled  -- continue Crestor  -- lipid panel with RTC       FOLLOW UP  Follow up in about 6 months (around 1/25/2025).   Patient instructed to bring BG logs to each follow up   Patient encouraged to call for any BG/medication issues, concerns, or questions.    Orders Placed This Encounter   Procedures    Hemoglobin A1C    Comprehensive Metabolic Panel    Lipid Panel

## 2024-07-29 ENCOUNTER — TELEPHONE (OUTPATIENT)
Dept: ENDOCRINOLOGY | Facility: CLINIC | Age: 79
End: 2024-07-29
Payer: MEDICARE

## 2024-07-29 NOTE — TELEPHONE ENCOUNTER
----- Message from Arley Swain sent at 7/29/2024 10:14 AM CDT -----  Type: Needs Medical Advice    Who Called:  Pt    Best Call Back Number: [phone]    Additional Information: Pt state that the jacob 2 slipped off  when she tired to put it on and the needle bent in half. She's states that she tried to get in touch with [facility] but she never answers her call unless the dr gets in touch. She can't afford to buy another one.  Please call back to advise, Thanks!

## 2024-08-13 ENCOUNTER — TELEPHONE (OUTPATIENT)
Dept: ENDOCRINOLOGY | Facility: CLINIC | Age: 79
End: 2024-08-13
Payer: MEDICARE

## 2024-08-13 NOTE — TELEPHONE ENCOUNTER
----- Message from Margaux Jolly LPN sent at 8/13/2024  9:30 AM CDT -----  Contact: Self    ----- Message -----  From: Anjelica Justice  Sent: 8/13/2024   8:09 AM CDT  To: Bassam Pleitez Staff    Type:  Patient Returning Call    Who Called:  Patient  Who Left Message for Patient:  Asking to speak to [place]  Does the patient know what this is regarding?:  yes  Best Call Back Number:  [phone]  Additional Information:  Thank You

## 2024-08-13 NOTE — TELEPHONE ENCOUNTER
S/w pt wanted to know if she should go to the nursing home to visit her friend.states when she goes there all the nurses are wearing masks. She states she had a bad case of Covid last time and she was wondering if she should go visit her friend. Advised her if she HAD to visit her friend,to make sure to wear a mask and keep her distance.,but if she didn't HAD to, to try to avoid it.  Also, states she got her Stephane 2 sensors from [employer] after one of her doctors have given her a Stephane 3. Pt states she returned the one her  Gave her since she got the 2

## 2024-08-15 DIAGNOSIS — E11.69 TYPE 2 DIABETES MELLITUS WITH OTHER SPECIFIED COMPLICATION, WITH LONG-TERM CURRENT USE OF INSULIN: ICD-10-CM

## 2024-08-15 DIAGNOSIS — Z79.4 TYPE 2 DIABETES MELLITUS WITH OTHER SPECIFIED COMPLICATION, WITH LONG-TERM CURRENT USE OF INSULIN: ICD-10-CM

## 2024-08-15 RX ORDER — INSULIN GLARGINE 300 U/ML
20 INJECTION, SOLUTION SUBCUTANEOUS DAILY
Qty: 6 PEN | Refills: 0 | Status: SHIPPED | OUTPATIENT
Start: 2024-08-15

## 2024-10-07 ENCOUNTER — TELEPHONE (OUTPATIENT)
Dept: ENDOCRINOLOGY | Facility: CLINIC | Age: 79
End: 2024-10-07
Payer: MEDICARE

## 2024-10-07 NOTE — TELEPHONE ENCOUNTER
Please call pt. Pain can cause blood sugars to elevate. For now, please have her increase Toujeo to 22 units. If morning (fasting) blood sugars remain over 150 after 3 days, increase further to 24 units. I hope she has some pain relief soon.

## 2024-10-07 NOTE — TELEPHONE ENCOUNTER
----- Message from Jeferson sent at 10/7/2024  3:49 PM CDT -----   Name of Who is Calling:     What is the request in detail:  patient request call back in reference to discuss diabetes  Please contact to further discuss and advise      Can the clinic reply by [facility]:     What Number to Call Back if not in [facility]:   [phone]

## 2024-10-07 NOTE — TELEPHONE ENCOUNTER
S/w pt and states has been with horrific back pain for 2 weeks and BG elevated.She went to she the pain management dr and she said pain can cause Bg to be elevated. Pt states she last night she ate a 1/2 sandwich and this AM her BG was 159. Right now BG at 210. Taking toujeo 20 units and ozempic    Not sharing jacob

## 2024-11-19 ENCOUNTER — TELEPHONE (OUTPATIENT)
Dept: ENDOCRINOLOGY | Facility: CLINIC | Age: 79
End: 2024-11-19
Payer: MEDICARE

## 2024-12-19 ENCOUNTER — TELEPHONE (OUTPATIENT)
Dept: ENDOCRINOLOGY | Facility: CLINIC | Age: 79
End: 2024-12-19
Payer: MEDICARE

## 2024-12-19 DIAGNOSIS — Z79.4 TYPE 2 DIABETES MELLITUS WITH OTHER SPECIFIED COMPLICATION, WITH LONG-TERM CURRENT USE OF INSULIN: Primary | ICD-10-CM

## 2024-12-19 DIAGNOSIS — E11.69 TYPE 2 DIABETES MELLITUS WITH OTHER SPECIFIED COMPLICATION, WITH LONG-TERM CURRENT USE OF INSULIN: Primary | ICD-10-CM

## 2024-12-19 RX ORDER — SEMAGLUTIDE 1.34 MG/ML
1 INJECTION, SOLUTION SUBCUTANEOUS
Start: 2024-12-19 | End: 2025-12-19

## 2024-12-19 NOTE — TELEPHONE ENCOUNTER
Please have her decrease her Toujeo to 60 units for now to see if this helps reduce the lows she's been having upon waking. We can increase the Ozempic to 1 mg if she is open to this. It can help reduce highs after meals. I'd rather try that before ordering mealtime insulin. Please ask if tolerating current Ozempic dose well. Then let me know and I can contact her pt assistance rep to change the order. Also, please advise her to do a fingerstick if blood sugar is low or unusually high, just to confirm

## 2024-12-19 NOTE — TELEPHONE ENCOUNTER
Spoke with patient and notified her of Pricilla previous message and verbalized understanding  States she has no diabetes supplies .Asked her if she wanted us to send her Rx for it to pharmacy.Pt states she could not afford it.She is very short on money right now because of so many surgeries she had

## 2024-12-19 NOTE — TELEPHONE ENCOUNTER
Regla, this pt will need an increase in her Ozempic dose to the 1 mg dose. Can you please send me the order form. Could you please ensure that she is set up for renewal for Ozempic 1 mg, Farxiga, Toujeo assistance? Thank you

## 2024-12-19 NOTE — TELEPHONE ENCOUNTER
S/w pt states her BG have been all over. States often after meals they are between 200-300 and in the moring they are in the low 60's. . Stats taking toujeo 22 units.she said if a insulin needs to be Rx to send it through the [facility]

## 2024-12-19 NOTE — TELEPHONE ENCOUNTER
----- Message from Bipin sent at 12/19/2024  3:52 PM CST -----  Type:  Needs Medical Advice    Who Called: PT    Symptoms (please be specific): Sugar issues     Would the patient rather a call back or a response via [facility]? Call back    Best Call Back Number: [phone]      Additional Information: Sts she is being treated for an ablation on Monday.  Sts that her diabetes has been albertina high during the day and drops at night.   Said she she needs to talk to Dr Banegas about getting a short time shot.  Said she needs to talk to someone about what she should do.  Should she be breaking up her TOUJEO  Please advise -- Thank you

## 2025-01-07 ENCOUNTER — TELEPHONE (OUTPATIENT)
Dept: ENDOCRINOLOGY | Facility: CLINIC | Age: 80
End: 2025-01-07
Payer: MEDICARE

## 2025-01-07 NOTE — TELEPHONE ENCOUNTER
"----- Message from Nurse Nolasco sent at 1/7/2025  2:17 PM CST -----  Regarding: FW: advice  Contact: patient  Called pt and she only wants to speak with you. States it's "a lot to go over" and will wait for you to be avail to call her.  ----- Message -----  From: Lexus Roman  Sent: 1/7/2025  11:40 AM CST  To: Bassam Pleitez Staff  Subject: advice                                           Type: Needs Medical Advice  Who Called:  patient  Symptoms (please be specific):    How long has patient had these symptoms:    Pharmacy name and phone #:        Best Call Back Number: [phone] (home)     Additional Information: patient states she had to get off of Ozempic 3 time.  She has gained 9 lbs.  Please call patient to advise.  Thanks!  "

## 2025-01-07 NOTE — TELEPHONE ENCOUNTER
"----- Message from Nurse Nolasco sent at 1/7/2025  2:17 PM CST -----  Regarding: FW: advice  Contact: patient  Called pt and she only wants to speak with you. States it's "a lot to go over" and will wait for you to be avail to call her.  ----- Message -----  From: Lexus Roman  Sent: 1/7/2025  11:40 AM CST  To: Bassam Pleitez Staff  Subject: advice                                           Type: Needs Medical Advice  Who Called:  patient  Symptoms (please be specific):    How long has patient had these symptoms:    Pharmacy name and phone #:        Best Call Back Number: [phone] (home)     Additional Information: patient states she had to get off of Ozempic 3 time.  She has gained 9 lbs.  Please call patient to advise.  Thanks!  "

## 2025-01-07 NOTE — TELEPHONE ENCOUNTER
Fasting blood sugars are within recommended target. I would recommend increasing the Ozempic back to 1 mg weekly. It may take a few weeks for this to reach steady state but we treat highs after meals with Ozempic. I see she has an upcoming appt this month. If blood sugars are still high after meals after being on Ozempic 1 mg for a few weeks, we may need to add another medication. Please make sure she confirms some of her elevated readings with a fingerstick. I believe she uses Freestyle Stephane. Vitamin C in doses over 500 mg daily can make the sensor data inaccurate.

## 2025-01-08 ENCOUNTER — TELEPHONE (OUTPATIENT)
Dept: PHARMACY | Facility: CLINIC | Age: 80
End: 2025-01-08
Payer: MEDICARE

## 2025-01-08 ENCOUNTER — TELEPHONE (OUTPATIENT)
Dept: ENDOCRINOLOGY | Facility: CLINIC | Age: 80
End: 2025-01-08

## 2025-01-08 DIAGNOSIS — E11.69 TYPE 2 DIABETES MELLITUS WITH OTHER SPECIFIED COMPLICATION, WITH LONG-TERM CURRENT USE OF INSULIN: ICD-10-CM

## 2025-01-08 DIAGNOSIS — Z79.4 TYPE 2 DIABETES MELLITUS WITH OTHER SPECIFIED COMPLICATION, WITH LONG-TERM CURRENT USE OF INSULIN: ICD-10-CM

## 2025-01-08 RX ORDER — INSULIN GLARGINE 300 U/ML
24 INJECTION, SOLUTION SUBCUTANEOUS DAILY
Start: 2025-01-08 | End: 2025-02-26 | Stop reason: SDUPTHER

## 2025-01-08 NOTE — TELEPHONE ENCOUNTER
We have reached out to Roseanne Gonzalez to inform her of the Az&Me, Van Nordisk, and Sanofi Patient Assistance Program re-enrollment process for Farxiga, Ozempic 1mg, and Toujeo Pen. Patient must provided the following documentation to re-apply for 2025: Proof of household Income(such as social security award letter, pension statement or 3 consecutive pay stubs) and Copy of all insurance cards (front and back)        Regla Brannon  Pharmacy Patient Assistance

## 2025-01-08 NOTE — TELEPHONE ENCOUNTER
Hi, Regla. Pt is requesting renewal (or refills) of her Toujeo and Ozempic through pt assistance. Could you please look into her status in these programs? Of note, she also gets Farxiga through PAP so could you please check status of that, as well? Thank you.

## 2025-01-08 NOTE — TELEPHONE ENCOUNTER
Spoke with patient and notified her of Pricilla's previous message and verbalized understanding     Pt states she tried to go to 1mg on th ozempic,but the pen shows her a bunch of 1s in a row so she does not know where to stop    She states she might be needing refills on toujeo and ozempic soon,she will need to get them from PAP

## 2025-01-08 NOTE — TELEPHONE ENCOUNTER
Please advise pt to wind all the way to the top of her pen. This is the only way to deliver 1 mg. If she hasn't been winding the pen until it doesn't wind anymore, I don't know what dose she was actually getting but it was minimal, which could explain her hyperglycemia.     May need to schedule nurse visit to show her how to dial up dose in pen.     I'll ask Pt assistance team to look into renewal of Ozempic, Toujeo.

## 2025-01-08 NOTE — LETTER
"     January 8, 2025    Roseanne Gonzalez  [address] [address]         Dear Roseanne Gonzalez    My Name is Regla Brannon. I am a Pharmacy Technician reaching out on behalf of [facility]acy Patient Assistance Team to inform you of the Az&Me, Van Nordisk, and Sanofi re-enrollment process.  We currently don't need any information from you at this time. However, to continue receiving Farxiga, Ozempic 1mg, and Toujeo Pen free of charge, the Az&Me, Van Nordisk, and Sanofi may require the following documentation.     Proof of household Income for 2025 (such as social security statement, 1099 form, pension statement or 3 consecutive pay stubs   Copy of 2025 Insurance cards( front and back)  MAC Authorization & Intake Forms  "LIS"/Medicare Extra Help Denial Letter     I will work with your Provider to get your re-enrollment application signed and submitted to the program for review. If you have received a re-enrollment letter and/or application from Az&Me, Van Nordisk, and Sanofi you may disregard.  If you have stopped taking Farxiga, Ozempic 1mg, and Toujeo Pen or if your dosage has changed please let me know ASAP.       If you have any questions or concerns regarding your 2025 re-enrollment process, please give me a call. Thank you for choosing [facility] your [facility] needs.        Sincerely  Regla BERNABE @[phone]   Pharmacy Patient Assistance Team  [address] [place] [address]  [address] [place] [address]  Fax: [phone]  Email: [email or web address]        "

## 2025-01-09 RX ORDER — ROSUVASTATIN CALCIUM 20 MG/1
TABLET, COATED ORAL
Qty: 90 TABLET | Refills: 3 | Status: SHIPPED | OUTPATIENT
Start: 2025-01-09

## 2025-01-10 RX ORDER — LOSARTAN POTASSIUM AND HYDROCHLOROTHIAZIDE 12.5; 5 MG/1; MG/1
1 TABLET ORAL
Qty: 90 TABLET | Refills: 3 | Status: SHIPPED | OUTPATIENT
Start: 2025-01-10

## 2025-01-16 ENCOUNTER — TELEPHONE (OUTPATIENT)
Dept: ENDOCRINOLOGY | Facility: CLINIC | Age: 80
End: 2025-01-16
Payer: MEDICARE

## 2025-01-16 NOTE — TELEPHONE ENCOUNTER
Lm notifying pt next available May.We can schedule her for next available and put her on wait list

## 2025-01-16 NOTE — TELEPHONE ENCOUNTER
Left message for patient to call back     Pt has appt w/you on 1/30 she has a dental procedure said is very important to have. She said she can't make it and does not want to wait until May(your next appt) Pt doesn't seem to use virtual visits(not activated) it seems she's done audio visits w/other providers

## 2025-01-16 NOTE — TELEPHONE ENCOUNTER
----- Message from Katie sent at 1/16/2025  9:44 AM CST -----  Type: Reschedule Appointment Request    Caller is requesting a sooner appointment.      Name of Caller:pt    When is the pt's appointment? 01/30/25    Would the patient rather a call back or a response via [facility]?  Call back    Best Call Back Number:[phone]    Additional Information: Pt has a dental appt at the same time as her appt on the 30th, her dental appt is kind of an emergency, could she come on the 01/23rd after her labs or at a later time on 01/30/25?  Please call back to advise. Thanks!

## 2025-01-16 NOTE — TELEPHONE ENCOUNTER
We are unable to do audio visits but we can override to get her in sooner. I see openings week of 2/3

## 2025-01-16 NOTE — TELEPHONE ENCOUNTER
----- Message from Morelia sent at 1/16/2025 10:12 AM CST -----  Contact: pt [phone]  Type:  Patient Returning Call    Who Called:  Pt   Who Left Message for Patient:  Gretta   Does the patient know what this is regarding?:  yes   Best Call Back Number: [phone]      Additional Information:  Pt advised of next avil and stated she can not wait that long. Pls call back and advise

## 2025-01-25 ENCOUNTER — TELEPHONE (OUTPATIENT)
Dept: PHARMACY | Facility: CLINIC | Age: 80
End: 2025-01-25
Payer: MEDICARE

## 2025-01-25 NOTE — TELEPHONE ENCOUNTER
Hello,       A Patient Assistance Application for Roseanne Gonzalez - MRN  [id number] was faxed to your office @[phone]. Please have Pricilla Banegas NP review the application to ensure the prescription is correct. If correct, sign and fax the application back to the Pharmacy Patient Assistance Team @[phone]. Do not fax to the Manufacture Program    Please do not write any corrections on this application.  If changes are needed,let me know ASAP and the corrected application will be re-faxed to your office for Provider signature.        Regla Brannon  Pharmacy Patient Assistance  [address] [facility] [address] [facility]  [address] [place] [address]  Fax: [phone]  Email: [email or web address]

## 2025-01-25 NOTE — TELEPHONE ENCOUNTER
Hello,       A Patient Assistance Application for Roseanne Gonzalez - MRN  [id number] was faxed to your office @[phone]. Please have Pricilla Banegas NP review the application to ensure the prescription is correct. If correct, sign and fax the application back to the Pharmacy Patient Assistance Team @[phone]. Do not fax to the Manufacture Program    Please do not write any corrections on this application.  If changes are needed,let me know ASAP and the corrected application will be re-faxed to your office for Provider signature.        Regla Brannon  Pharmacy Patient Assistance  [address] [facility]  [address] [place] [address]  Fax: [phone]  Email: [email or web address]

## 2025-01-28 ENCOUNTER — LAB VISIT (OUTPATIENT)
Dept: LAB | Facility: HOSPITAL | Age: 80
End: 2025-01-28
Attending: NURSE PRACTITIONER
Payer: MEDICARE

## 2025-01-28 DIAGNOSIS — E11.69 TYPE 2 DIABETES MELLITUS WITH OTHER SPECIFIED COMPLICATION, WITH LONG-TERM CURRENT USE OF INSULIN: ICD-10-CM

## 2025-01-28 DIAGNOSIS — Z79.4 TYPE 2 DIABETES MELLITUS WITH OTHER SPECIFIED COMPLICATION, WITH LONG-TERM CURRENT USE OF INSULIN: ICD-10-CM

## 2025-01-28 LAB
ALBUMIN SERPL BCP-MCNC: 4.2 G/DL (ref 3.5–5.2)
ALP SERPL-CCNC: 40 U/L (ref 40–150)
ALT SERPL W/O P-5'-P-CCNC: 17 U/L (ref 10–44)
ANION GAP SERPL CALC-SCNC: 8 MMOL/L (ref 8–16)
AST SERPL-CCNC: 22 U/L (ref 10–40)
BILIRUB SERPL-MCNC: 0.8 MG/DL (ref 0.1–1)
BUN SERPL-MCNC: 23 MG/DL (ref 8–23)
CALCIUM SERPL-MCNC: 9.2 MG/DL (ref 8.7–10.5)
CHLORIDE SERPL-SCNC: 103 MMOL/L (ref 95–110)
CHOLEST SERPL-MCNC: 137 MG/DL (ref 120–199)
CHOLEST/HDLC SERPL: 2.4 {RATIO} (ref 2–5)
CO2 SERPL-SCNC: 27 MMOL/L (ref 23–29)
CREAT SERPL-MCNC: 1 MG/DL (ref 0.5–1.4)
EST. GFR  (NO RACE VARIABLE): 58 ML/MIN/1.73 M^2
ESTIMATED AVG GLUCOSE: 137 MG/DL (ref 68–131)
GLUCOSE SERPL-MCNC: 96 MG/DL (ref 70–110)
HBA1C MFR BLD: 6.4 % (ref 4–5.6)
HDLC SERPL-MCNC: 57 MG/DL (ref 40–75)
HDLC SERPL: 41.6 % (ref 20–50)
LDLC SERPL CALC-MCNC: 67.2 MG/DL (ref 63–159)
NONHDLC SERPL-MCNC: 80 MG/DL
POTASSIUM SERPL-SCNC: 4 MMOL/L (ref 3.5–5.1)
PROT SERPL-MCNC: 6.7 G/DL (ref 6–8.4)
SODIUM SERPL-SCNC: 138 MMOL/L (ref 136–145)
TRIGL SERPL-MCNC: 64 MG/DL (ref 30–150)

## 2025-01-28 PROCEDURE — 80053 COMPREHEN METABOLIC PANEL: CPT | Performed by: NURSE PRACTITIONER

## 2025-01-28 PROCEDURE — 83036 HEMOGLOBIN GLYCOSYLATED A1C: CPT | Performed by: NURSE PRACTITIONER

## 2025-01-28 PROCEDURE — 80061 LIPID PANEL: CPT | Performed by: NURSE PRACTITIONER

## 2025-01-30 ENCOUNTER — OFFICE VISIT (OUTPATIENT)
Dept: ENDOCRINOLOGY | Facility: CLINIC | Age: 80
End: 2025-01-30
Payer: MEDICARE

## 2025-01-30 VITALS
WEIGHT: 154.63 LBS | HEART RATE: 57 BPM | DIASTOLIC BLOOD PRESSURE: 60 MMHG | BODY MASS INDEX: 26.4 KG/M2 | HEIGHT: 64 IN | SYSTOLIC BLOOD PRESSURE: 102 MMHG

## 2025-01-30 DIAGNOSIS — I25.10 CORONARY ARTERY DISEASE INVOLVING NATIVE CORONARY ARTERY OF NATIVE HEART WITHOUT ANGINA PECTORIS: ICD-10-CM

## 2025-01-30 DIAGNOSIS — E78.5 HYPERLIPIDEMIA, UNSPECIFIED HYPERLIPIDEMIA TYPE: ICD-10-CM

## 2025-01-30 DIAGNOSIS — E05.90 HYPERTHYROIDISM: ICD-10-CM

## 2025-01-30 DIAGNOSIS — I10 PRIMARY HYPERTENSION: ICD-10-CM

## 2025-01-30 DIAGNOSIS — E11.69 TYPE 2 DIABETES MELLITUS WITH OTHER SPECIFIED COMPLICATION, WITH LONG-TERM CURRENT USE OF INSULIN: Primary | ICD-10-CM

## 2025-01-30 DIAGNOSIS — Z79.4 TYPE 2 DIABETES MELLITUS WITH OTHER SPECIFIED COMPLICATION, WITH LONG-TERM CURRENT USE OF INSULIN: Primary | ICD-10-CM

## 2025-01-30 PROCEDURE — 99999 PR PBB SHADOW E&M-EST. PATIENT-LVL V: CPT | Mod: PBBFAC,,, | Performed by: NURSE PRACTITIONER

## 2025-01-30 RX ORDER — FLUCONAZOLE 150 MG/1
150 TABLET ORAL ONCE
COMMUNITY
Start: 2024-12-10

## 2025-01-30 RX ORDER — ESTRADIOL 0.1 MG/G
CREAM VAGINAL
COMMUNITY
Start: 2024-12-10

## 2025-01-30 RX ORDER — GABAPENTIN 300 MG/1
300 CAPSULE ORAL
COMMUNITY
Start: 2025-01-07

## 2025-01-30 NOTE — PROGRESS NOTES
CC: Ms. Roseanne Gonzalez arrives today for management of Type 2 DM and review of chronic medical conditions, as listed in the Visit Diagnosis section of this encounter.       HPI: Ms. Roseanne Gonzalez was diagnosed with Type 2 DM in 2007. She was diagnosed based on lab work following CABG. Initial treatment consisted of metformin. Insulin added about 10 years after diagnosis. Denies FH of DM. Denies hospitalizations due to DM.   She has diagnosis of CAD with past CABG. Follows with cardiology.   She also has hyperthyroidism and takes methimazole.     Patient was last seen by me in July.    Toujeo and Ozmepic paperwork has been turned in for renewal of patient assistance programs.     She states that she was off of Ozempic for 1 month, due to back procedures. Gained 10 # during this time. Resumed Ozempic 2 weeks ago.    BG monitoring per Freestyle Stephane. She will be starting new Freestyle Stephane 3. Just received new rx.    Hypoglycemia: rare  Hypoglycemic Symptoms:  fatigue  Hypoglycemia Treatment: 1/2 candy bar, PB    Missing Insulin/PO medication doses: No    Exercise: Yes - goes [employer] 3 days/week     Dietary Habits: Eats 2-3 meals/day. Avoids sugary beverages.    Last DM education appointment: 6/2021      CURRENT DIABETIC MEDS:  Farxiga 5 mg daily, Ozempic 1 mg weekly, Toujeo 24 units QHS  -- receives all from PAP  Vial or pen: pen  Glucometer type:     Previous DM treatments:  Metformin - diarrhea   Januvia - rash?  Trulicity     Last Eye Exam: 6/24/2024, no DR. Dr. Caraballo (@ Dr. Boo's office)  Last Podiatry Exam: n/a    REVIEW OF SYSTEMS  Constitutional: no c/o fatigue, weakness,  weight loss. + weight gain  Cardiac: no palpitations or chest pain.  Respiratory: no cough or dyspnea.  GI: no c/o nausea, abdominal pain. Denies h/o pancreatitis.   Skin: no lesions or rashes.   Neuro: no tingling, or parasthesias. + intermittent numbness in L foot only since CABG (L graft site). Does report L sided  "DDD of spine.  Endocrine: denies polyphagia, polydipsia, polyuria      Personally reviewed Past Medical, Surgical, Social History.    Vital Signs  /60   Pulse (!) 57   Ht 5' 4" (1.626 m)   Wt 70.1 kg (154 lb 10.4 oz)   BMI 26.55 kg/m²     Personally reviewed the below labs:    Hemoglobin A1C   Date Value Ref Range Status   01/28/2025 6.4 (H) 4.0 - 5.6 % Final     Comment:     ADA Screening Guidelines:  5.7-6.4%  Consistent with prediabetes  >or=6.5%  Consistent with diabetes    High levels of fetal hemoglobin interfere with the HbA1C  assay. Heterozygous hemoglobin variants (HbS, HgC, etc)do  not significantly interfere with this assay.   However, presence of multiple variants may affect accuracy.     07/18/2024 6.3 (H) 4.0 - 5.6 % Final     Comment:     ADA Screening Guidelines:  5.7-6.4%  Consistent with prediabetes  >or=6.5%  Consistent with diabetes    High levels of fetal hemoglobin interfere with the HbA1C  assay. Heterozygous hemoglobin variants (HbS, HgC, etc)do  not significantly interfere with this assay.   However, presence of multiple variants may affect accuracy.     03/05/2024 6.4 (H) 4.0 - 5.6 % Final     Comment:     ADA Screening Guidelines:  5.7-6.4%  Consistent with prediabetes  >or=6.5%  Consistent with diabetes    High levels of fetal hemoglobin interfere with the HbA1C  assay. Heterozygous hemoglobin variants (HbS, HgC, etc)do  not significantly interfere with this assay.   However, presence of multiple variants may affect accuracy.         Chemistry        Component Value Date/Time     01/28/2025 0841    K 4.0 01/28/2025 0841     01/28/2025 0841    CO2 27 01/28/2025 0841    BUN 23 01/28/2025 0841    CREATININE 1.0 01/28/2025 0841    GLU 96 01/28/2025 0841        Component Value Date/Time    CALCIUM 9.2 01/28/2025 0841    ALKPHOS 40 01/28/2025 0841    AST 22 01/28/2025 0841    ALT 17 01/28/2025 0841    BILITOT 0.8 01/28/2025 0841    ESTGFRAFRICA >60.0 04/30/2021 0758    " EGFRNONAA 69 02/17/2022 0829          Lab Results   Component Value Date    CHOL 137 01/28/2025    CHOL 113 (L) 03/05/2024    CHOL 124 11/17/2022     Lab Results   Component Value Date    HDL 57 01/28/2025    HDL 53 03/05/2024    HDL 52 11/17/2022     Lab Results   Component Value Date    LDLCALC 67.2 01/28/2025    LDLCALC 50.6 (L) 03/05/2024    LDLCALC 58 11/17/2022     Lab Results   Component Value Date    TRIG 64 01/28/2025    TRIG 47 03/05/2024    TRIG 66 11/17/2022     Lab Results   Component Value Date    CHOLHDL 41.6 01/28/2025    CHOLHDL 46.9 03/05/2024    CHOLHDL 28.4 04/30/2021       Lab Results   Component Value Date    MICALBCREAT 4.1 07/18/2024     Lab Results   Component Value Date    TSH 2.357 07/18/2024       CrCl cannot be calculated (Unknown ideal weight.).    Vit D, 25-Hydroxy   Date Value Ref Range Status   11/06/2017 41 30 - 96 ng/mL Final     Comment:     Vitamin D deficiency.........<10 ng/mL                              Vitamin D insufficiency......10-29 ng/mL       Vitamin D sufficiency........> or equal to 30 ng/mL  Vitamin D toxicity............>100 ng/mL           PHYSICAL EXAMINATION  Constitutional: Appears well, no distress  Respiratory: CTA, even and unlabored.  Cardiovascular: RRR, no murmurs, no carotid bruits.   GI: bowel sounds active, no hernia noted  Skin: warm and dry; no visible wounds  Neuro: oriented to person, place, time  Feet: appropriate footwear.      FREESTYLE FILIBERTO DOWNLOAD: Most fasting glucoses are well controlled. Occasional mild nocturnal hypoglycemia noted. Sporadic prandial excursions.              Goals        HEMOGLOBIN A1C < 7               Assessment/Plan  1. Type 2 diabetes mellitus with other specified complication, with long-term current use of insulin  -- Well controlled. Awaiting next Toujeo, Ozempic shipment.  -- decrease Toujeo slightly to 22 units.  -- continue Farxiga, Ozempic  -- continue with plan to upgrade to Freestyle Filiberto 3    -- Discussed  diagnosis of DM, A1c goals, progression of disease, long term complications and tx options.    -- Reviewed hypoglycemia management: treat with 4 oz of juice, 4 oz regular soda, or 4 glucose tablets. Monitor and repeat treatment every 15 minutes until BG is >70 Then have a snack, which includes 15 grams of complex carbohydrates and protein.   Advised patient to check BG before activities, such as driving or exercise.    -- takes statin, aspirin, ARB   2. Coronary artery disease involving native coronary artery of native heart without angina pectoris  -- follows with cardiology  -- Ozempic may add benefit  -- avoid hypoglycemia   3. Primary hypertension  -- controlled  -- on ARB   4. Hyperthyroidism  -- stable  -- continue methimazole (takes M,W,F)  -- TSH with RTC   5. Hyperlipidemia, unspecified hyperlipidemia type  -- controlled  -- continue Crestor       FOLLOW UP  Follow up in about 6 months (around 7/30/2025).   Patient instructed to bring BG logs to each follow up   Patient encouraged to call for any BG/medication issues, concerns, or questions.    Orders Placed This Encounter   Procedures    Hemoglobin A1C    Comprehensive Metabolic Panel    Microalbumin/Creatinine Ratio, Urine    TSH

## 2025-01-31 ENCOUNTER — TELEPHONE (OUTPATIENT)
Dept: ENDOCRINOLOGY | Facility: CLINIC | Age: 80
End: 2025-01-31
Payer: MEDICARE

## 2025-02-26 DIAGNOSIS — Z79.4 TYPE 2 DIABETES MELLITUS WITH OTHER SPECIFIED COMPLICATION, WITH LONG-TERM CURRENT USE OF INSULIN: ICD-10-CM

## 2025-02-26 DIAGNOSIS — E11.69 TYPE 2 DIABETES MELLITUS WITH OTHER SPECIFIED COMPLICATION, WITH LONG-TERM CURRENT USE OF INSULIN: ICD-10-CM

## 2025-02-26 RX ORDER — INSULIN GLARGINE 300 U/ML
22 INJECTION, SOLUTION SUBCUTANEOUS DAILY
Qty: 6.6 ML | Refills: 1 | Status: CANCELLED | OUTPATIENT
Start: 2025-02-26

## 2025-02-26 RX ORDER — INSULIN GLARGINE 300 U/ML
INJECTION, SOLUTION SUBCUTANEOUS
Qty: 4.5 ML | Refills: 3 | Status: SHIPPED | OUTPATIENT
Start: 2025-02-26

## 2025-02-26 NOTE — TELEPHONE ENCOUNTER
Hey,    Pt states she has not received her toujeo and ozempic yet.she said that she went to the [facility] in [facility] and she was told we have not sign off on them?? We sent all paperwork to you on 1/31. Can you please look at this and see what is going on?  thanks

## 2025-02-26 NOTE — TELEPHONE ENCOUNTER
Please call pt. I am confused. All paperwork was submitted to Patient Assistance on 1/31, including my portion. To my knowledge, this should now be with the manufacturers. I have reached out to Regla Brannon for confirmation of this.     I am confused as to why this Toujeo rx is requested to go to [facility] in [place]. Is she in [place]?? For meds received from Patient Assistance programs, we have to wait for that delivery to arrive at [facility] in [place] before they can dispense it.     I sent this today to [facility] in [place]. Please let me know if she truly did want it to go to [facility] in [place] (which is not the Pt assistance pharmacy)

## 2025-02-26 NOTE — TELEPHONE ENCOUNTER
Regla Tavares. Can you please provide update on Toujeo PAP and Ozempic PAP? We faxed paperwork over on 1/31. Thank you.

## 2025-02-26 NOTE — TELEPHONE ENCOUNTER
Please call pt for more details. Her Toujeo and Ozempic PAP paperwork was faxed to Regla Brannon on 1/31. Would she like a 30 day supply of either of these if she hasn't received shipment to  yet?

## 2025-02-26 NOTE — TELEPHONE ENCOUNTER
----- Message from Viktoriaon sent at 2/26/2025  2:41 PM CST -----  Type:  Needs Medical AdviceWho Called: Tahira name and phone #:  [facility] [place]: [phone]: [phone] the patient rather a call back or a response via [facility]? Call [facility] Call Back Number: [phone] Information: Pt said she needs Pricilla Fang  to sign off on her toujeo  with [facility] thru [facility], the pharmacy closes at 3PM daily and she is on her last PEN. Please call the pharmacy before Eamon Belle.   Please call back to advise. Thanks!

## 2025-02-26 NOTE — TELEPHONE ENCOUNTER
----- Message from Prasad sent at 2/26/2025  8:47 AM CST -----  Contact: Pt [phone]  Type:  RX Refill RequestWho Called: PtRefill or New Rx: RefillRX Name and Strength:TOUJEO SOLOSTAR U-300 INSULIN 300 unit/mL (1.5 mL) InPn penHow is the patient currently taking it? (ex. 1XDay):1 xIs this a 30 day or 90 day RX:[facility] [facility] [address]Udbeh: [phone] Fax: [phone] Local or Mail Order:LocalOrdering Provider:Johnnyould the patient rather a call back or a response via [facility]? [facility] Call Back Number:[phone] Additional Information: Pt adv pharm told her the Rx was not signed off on. Pt is down to one pen. Pls call and adv. Thank you.

## 2025-02-27 NOTE — TELEPHONE ENCOUNTER
Courtney navarroise this pt is freaking out about her Rxs. She said she is at the [facility] waiting for the Rx for toujeo and ozempic to be approved. We faxed the orders to you this morning

## 2025-02-27 NOTE — TELEPHONE ENCOUNTER
Pt will come to the [facility] tomorrow to p/u 2 samples of toujeo max per Pricilla between 8-9

## 2025-03-06 ENCOUNTER — TELEPHONE (OUTPATIENT)
Dept: ENDOCRINOLOGY | Facility: CLINIC | Age: 80
End: 2025-03-06
Payer: MEDICARE

## 2025-03-06 DIAGNOSIS — Z79.4 TYPE 2 DIABETES MELLITUS WITH OTHER SPECIFIED COMPLICATION, WITH LONG-TERM CURRENT USE OF INSULIN: ICD-10-CM

## 2025-03-06 DIAGNOSIS — E11.69 TYPE 2 DIABETES MELLITUS WITH OTHER SPECIFIED COMPLICATION, WITH LONG-TERM CURRENT USE OF INSULIN: ICD-10-CM

## 2025-03-06 RX ORDER — SEMAGLUTIDE 1.34 MG/ML
1 INJECTION, SOLUTION SUBCUTANEOUS
Qty: 3 ML | Refills: 11 | Status: SHIPPED | OUTPATIENT
Start: 2025-03-06 | End: 2026-03-06

## 2025-03-06 NOTE — TELEPHONE ENCOUNTER
Lm notifying pt her toujeo order form PAP was here in the [facility]. To let us know if she could pick it up either today or tomorrow

## 2025-03-06 NOTE — TELEPHONE ENCOUNTER
Pt waiting in the lobby to have this sent to Pharmacy ASAP so she can pick it up.States the pharmacy is not closed to her home so she wants to pass by before she heads home

## 2025-04-04 DIAGNOSIS — Z79.4 TYPE 2 DIABETES MELLITUS WITH OTHER SPECIFIED COMPLICATION, WITH LONG-TERM CURRENT USE OF INSULIN: ICD-10-CM

## 2025-04-04 DIAGNOSIS — E11.69 TYPE 2 DIABETES MELLITUS WITH OTHER SPECIFIED COMPLICATION, WITH LONG-TERM CURRENT USE OF INSULIN: ICD-10-CM

## 2025-04-04 RX ORDER — SEMAGLUTIDE 1.34 MG/ML
1 INJECTION, SOLUTION SUBCUTANEOUS
Qty: 4 EACH | Refills: 0 | Status: SHIPPED | OUTPATIENT
Start: 2025-04-04 | End: 2026-04-04

## 2025-04-05 PROBLEM — N18.31 CHRONIC KIDNEY DISEASE, STAGE 3A: Status: ACTIVE | Noted: 2025-04-05

## 2025-04-17 ENCOUNTER — TELEPHONE (OUTPATIENT)
Dept: ENDOCRINOLOGY | Facility: CLINIC | Age: 80
End: 2025-04-17
Payer: MEDICARE

## 2025-04-17 DIAGNOSIS — E11.69 TYPE 2 DIABETES MELLITUS WITH OTHER SPECIFIED COMPLICATION, WITH LONG-TERM CURRENT USE OF INSULIN: ICD-10-CM

## 2025-04-17 DIAGNOSIS — Z79.4 TYPE 2 DIABETES MELLITUS WITH OTHER SPECIFIED COMPLICATION, WITH LONG-TERM CURRENT USE OF INSULIN: ICD-10-CM

## 2025-04-17 RX ORDER — INSULIN GLARGINE 300 U/ML
INJECTION, SOLUTION SUBCUTANEOUS
Qty: 4.5 ML | Refills: 0 | Status: SHIPPED | OUTPATIENT
Start: 2025-04-17

## 2025-04-17 RX ORDER — INSULIN GLARGINE 300 U/ML
INJECTION, SOLUTION SUBCUTANEOUS
Qty: 4.5 ML | Refills: 3 | Status: SHIPPED | OUTPATIENT
Start: 2025-04-17 | End: 2025-04-17 | Stop reason: SDUPTHER

## 2025-04-17 NOTE — TELEPHONE ENCOUNTER
Spoke to patient, she is having trouble getting her medications and sensors from PAP due to shipping issues. She has a call into Regla Clov[facility] but wanted to let Providers know she is having  trouble.

## 2025-04-17 NOTE — TELEPHONE ENCOUNTER
I will send another message to Pt Assistance rep, Regla, about insulin. I see her sensors were sent through pharmacy. I am unaware of a pt assistance program for these but can we try sending her Stephane 3 PLUS through DME?

## 2025-04-17 NOTE — TELEPHONE ENCOUNTER
----- Message from Angelica sent at 4/17/2025 10:28 AM CDT -----  Type:  Needs Medical AdviceWho Called: ptWould the patient rather a call back or a response via [facility]? [facility] [place] Call Back Number: [phone] Additional Information: pt st jacob 3 fell off & st she needs another one. please call to discuss

## 2025-04-17 NOTE — TELEPHONE ENCOUNTER
----- Message from David sent at 4/17/2025  3:58 PM CDT -----  Type:  Needs Medical AdviceWho Called: Regla , Pharmacy Patient Assistance [facility] Would the patient rather a call back or a response via [facility]? Call Back [facility] Call Back Number: [phone] Regla Additional Information: patient rx was delivered to wrong address, it should have been delivered to pharmacy , but instead it was sent to providers office on 01/25/25  , Keron Luna signed for it ,   please call to advise, Thank You.

## 2025-04-17 NOTE — TELEPHONE ENCOUNTER
Sonny for Regla to call back.Per Pricilla Tariq was sent to [facility] for them to mail it to pt

## 2025-04-17 NOTE — TELEPHONE ENCOUNTER
----- Message from Pharmacist Bill sent at 4/17/2025 12:26 PM CDT -----  Regarding: Patient Assistance Program  [facility] has received medication from [facility] patient assistance program for your patient Roseanne Gonzalez ([id number]).  At your earliest convenience please send to [facility] escript for Toujeo Solostar Max U300 x 1 box.  Thanks

## 2025-04-17 NOTE — TELEPHONE ENCOUNTER
----- Message from Linda sent at 4/17/2025 11:01 AM CDT -----  Regarding: Returning call  Type:  Patient Returning CallWho Called:  PtRichard Left Message for Patient:  Ambrose the patient know what this is regarding?:  YesBest Call Back Number:  [phone] Information:

## 2025-04-17 NOTE — TELEPHONE ENCOUNTER
Please let pt know that I just received the message from Pt Nichol that her Toujeo arrived at [facility]. They should be coordinating delivery

## 2025-04-21 ENCOUNTER — TELEPHONE (OUTPATIENT)
Dept: ENDOCRINOLOGY | Facility: CLINIC | Age: 80
End: 2025-04-21
Payer: MEDICARE

## 2025-04-21 NOTE — TELEPHONE ENCOUNTER
Regla reynoso still has not received her Toujeo    Per Pricilla Toujeo was sent to [facility] for them to mail it to pt

## 2025-04-21 NOTE — TELEPHONE ENCOUNTER
----- Message from Morelia sent at 4/21/2025  9:30 AM CDT -----  Contact: pt [phone]  Type: Needs Medical AdviceWho Called:  Pt Best Call Back Number: [phone] Information: Pt calling to f/u on TOUJEO rx. She still has not received her rx. Pls call back and advise  ----- Message -----  From: Morelia Collado  Sent: 4/21/2025   9:31 AM CDT  To: Bassam Pleitez Staff    Type: Needs Medical AdviceWho Called:  Pt Best Call Back Number: [phone] Information: Pt calling to f/u on TOUJEO rx. Pls call back and advise

## 2025-04-21 NOTE — TELEPHONE ENCOUNTER
S/w pt notified her the toujeo was sent to the [facility] and gave her the number to call to see what is going on /her toujeo

## 2025-04-23 ENCOUNTER — TELEPHONE (OUTPATIENT)
Dept: ENDOCRINOLOGY | Facility: CLINIC | Age: 80
End: 2025-04-23
Payer: MEDICARE

## 2025-04-23 NOTE — TELEPHONE ENCOUNTER
----- Message from EVL Gonzalez,CLIFFORD-C sent at 4/22/2025  4:51 PM CDT -----  Regarding: FW: Chandni  Gretta, I see that you notified pt that her NovoNordisk patient assistance was sent to the [facility] on 3/6 and you spoke to pt to coordinate . Did she ? Trying to figure out how she is already out of her supply bc she is requesting a refill and she can't afford to get from pharmacy. I'm CCing Regla so she's also kept in the loop. May want to confirm pt picked up when you asked her to and ask how many pens she received. This needs to go through [facility] Patient Assistance team in the future. Regla, do you know when next refill is due?Thank you,Pricilla  ----- Message -----  From: Regla Brannon  Sent: 4/22/2025   8:17 AM CDT  To: VEL Ford,CLIFFORD-C  Subject: RE: Tomanan Flores morning,I spoke with a [employer] rep. And she stated the medication was delivered to the [place] office on 3/6/25 and signed by MATT Enriquez  ----- Message -----  From: Pricilla Banegas APRN,CLIFFORD-C  Sent: 4/17/2025   1:02 PM CDT  To: Regla Brannon  Subject: Regla Cassa. Do you have an update on this pt's Toujeo status?Thanks,Pricilla Banegas NP

## 2025-04-24 NOTE — TELEPHONE ENCOUNTER
"----- Message from VEL Gonzalez FNP-C sent at 4/24/2025 12:48 PM CDT -----  Regarding: RE: Regla Casas. Patient was made aware on 3/6 that her insulin arrived and needed to be picked up that day. It is documented below by our nursing staff that pt said she'd be coming to . Gretta, can you confirm that she picked it up on the day she said she would? This is all a mystery.  I see in chart this was sent to [facility] again on 4/17. Can you ask pt if she is in need of Toujeo to be sent to local pharmacy? "Gretta Calix LPNCP  3/6/25 11:50 AMNoteS/w pt she will be coming today to p/u toujeo at around 3-3:30"  ----- Message -----  From: Regla Brannon  Sent: 4/22/2025   5:03 PM CDT  To: VEL Ford,CLIFFORD-URVASHI  Subject: RE: Chandni HOLLIDAY,Patient stated she never picked her medication up from the [place] location, she was not aware that her medication was shipped there until, I relayed the message to her that I had received from the [employer] rep. Patient medication is still there and whoever is N Mina, they signed for it on 3/6. Patient should have been contacted, but she was not.  ----- Message -----  From: Pricilla Banegas APRN,RACHAEL  Sent: 4/22/2025   4:54 PM CDT  To: Gretta Calix LPN; Regla Brannon  Subject: FW: Chandni Glynn, I see that you notified pt that her NovoNordisk patient assistance was sent to the [facility] on 3/6 and you spoke to pt to coordinate . Did she ? Trying to figure out how she is already out of her supply bc she is requesting a refill and she can't afford to get from pharmacy. I'm CCiadiel Arauz so she's also kept in the loop. May want to confirm pt picked up when you asked her to and ask how many pens she received. This needs to go through [facility] Patient Assistance team in the future. Regla, do you know when next refill is due?Thank " you,Pricilla  ----- Message -----  From: Regla Brannon  Sent: 4/22/2025   8:17 AM CDT  To: VEL Ford,HALLIEP-C  Subject: RE: Toujeo                                       Good morning,I spoke with a [employer] rep. And she stated the medication was delivered to the [place] office on 3/6/25 and signed by MATT Enriquez  ----- Message -----  From: Pricilla Banegas APRN,HALLIEP-C  Sent: 4/17/2025   1:02 PM CDT  To: Regla Brannon  Subject: ToushelbyRegla Barnett. Do you have an update on this pt's Toujeo status?Thanks,Pricilla Banegas, TIFFANIE

## 2025-04-24 NOTE — TELEPHONE ENCOUNTER
Per yesterday's call pt stated she did picked up her Toujeo on 3/6 and she stated she finally got her ozempic and toujeo from the pharmacy.so not sure why is she telling you something else.Thanks

## 2025-05-16 ENCOUNTER — LAB VISIT (OUTPATIENT)
Dept: LAB | Facility: HOSPITAL | Age: 80
End: 2025-05-16
Payer: MEDICARE

## 2025-05-16 DIAGNOSIS — Z79.899 ON STATIN THERAPY: ICD-10-CM

## 2025-05-16 DIAGNOSIS — I25.10 CORONARY ARTERY DISEASE INVOLVING NATIVE CORONARY ARTERY OF NATIVE HEART WITHOUT ANGINA PECTORIS: ICD-10-CM

## 2025-05-16 DIAGNOSIS — I70.0 AORTIC ATHEROSCLEROSIS: ICD-10-CM

## 2025-05-16 DIAGNOSIS — I15.2 HYPERTENSION ASSOCIATED WITH DIABETES: ICD-10-CM

## 2025-05-16 DIAGNOSIS — Z95.1 S/P CABG (CORONARY ARTERY BYPASS GRAFT): ICD-10-CM

## 2025-05-16 DIAGNOSIS — Z79.899 LONG-TERM CURRENT USE OF PROTON PUMP INHIBITOR THERAPY: ICD-10-CM

## 2025-05-16 DIAGNOSIS — E11.51 TYPE 2 DIABETES MELLITUS WITH DIABETIC PERIPHERAL ANGIOPATHY WITHOUT GANGRENE, WITHOUT LONG-TERM CURRENT USE OF INSULIN: ICD-10-CM

## 2025-05-16 DIAGNOSIS — E11.59 HYPERTENSION ASSOCIATED WITH DIABETES: ICD-10-CM

## 2025-05-16 DIAGNOSIS — I10 ESSENTIAL (PRIMARY) HYPERTENSION: ICD-10-CM

## 2025-05-16 LAB
ABSOLUTE EOSINOPHIL (OHS): 0.19 K/UL
ABSOLUTE MONOCYTE (OHS): 0.63 K/UL (ref 0.3–1)
ABSOLUTE NEUTROPHIL COUNT (OHS): 4.98 K/UL (ref 1.8–7.7)
BASOPHILS # BLD AUTO: 0.07 K/UL
BASOPHILS NFR BLD AUTO: 0.9 %
CK SERPL-CCNC: 86 U/L (ref 20–180)
ERYTHROCYTE [DISTWIDTH] IN BLOOD BY AUTOMATED COUNT: 12.7 % (ref 11.5–14.5)
HCT VFR BLD AUTO: 47.1 % (ref 37–48.5)
HGB BLD-MCNC: 15.5 GM/DL (ref 12–16)
IMM GRANULOCYTES # BLD AUTO: 0.03 K/UL (ref 0–0.04)
IMM GRANULOCYTES NFR BLD AUTO: 0.4 % (ref 0–0.5)
LYMPHOCYTES # BLD AUTO: 1.58 K/UL (ref 1–4.8)
MCH RBC QN AUTO: 31.1 PG (ref 27–31)
MCHC RBC AUTO-ENTMCNC: 32.9 G/DL (ref 32–36)
MCV RBC AUTO: 95 FL (ref 82–98)
NUCLEATED RBC (/100WBC) (OHS): 0 /100 WBC
PLATELET # BLD AUTO: 225 K/UL (ref 150–450)
PMV BLD AUTO: 10.6 FL (ref 9.2–12.9)
RBC # BLD AUTO: 4.98 M/UL (ref 4–5.4)
RELATIVE EOSINOPHIL (OHS): 2.5 %
RELATIVE LYMPHOCYTE (OHS): 21.1 % (ref 18–48)
RELATIVE MONOCYTE (OHS): 8.4 % (ref 4–15)
RELATIVE NEUTROPHIL (OHS): 66.7 % (ref 38–73)
TSH SERPL-ACNC: 2.74 UIU/ML (ref 0.4–4)
VIT B12 SERPL-MCNC: 394 PG/ML (ref 210–950)
WBC # BLD AUTO: 7.48 K/UL (ref 3.9–12.7)

## 2025-05-16 PROCEDURE — 84443 ASSAY THYROID STIM HORMONE: CPT

## 2025-05-16 PROCEDURE — 82607 VITAMIN B-12: CPT

## 2025-05-16 PROCEDURE — 82550 ASSAY OF CK (CPK): CPT

## 2025-05-16 PROCEDURE — 36415 COLL VENOUS BLD VENIPUNCTURE: CPT | Mod: PN

## 2025-05-16 PROCEDURE — 85025 COMPLETE CBC W/AUTO DIFF WBC: CPT

## 2025-05-28 ENCOUNTER — TELEPHONE (OUTPATIENT)
Dept: ENDOCRINOLOGY | Facility: CLINIC | Age: 80
End: 2025-05-28
Payer: MEDICARE

## 2025-05-28 DIAGNOSIS — Z79.4 TYPE 2 DIABETES MELLITUS WITH OTHER SPECIFIED COMPLICATION, WITH LONG-TERM CURRENT USE OF INSULIN: Primary | ICD-10-CM

## 2025-05-28 DIAGNOSIS — E11.69 TYPE 2 DIABETES MELLITUS WITH OTHER SPECIFIED COMPLICATION, WITH LONG-TERM CURRENT USE OF INSULIN: Primary | ICD-10-CM

## 2025-05-28 RX ORDER — INSULIN ASPART 100 [IU]/ML
INJECTION, SOLUTION INTRAVENOUS; SUBCUTANEOUS
Qty: 15 ML | Refills: 0 | Status: SHIPPED | OUTPATIENT
Start: 2025-05-28

## 2025-05-28 NOTE — TELEPHONE ENCOUNTER
"S/w pt, went to urgent care yesterday because she's "covered in a rash". They prescribed prednisone and advised her to f/u with endocrine if BGs start to go up. She will be on 20 mg daily of prednisone x 5 days, took first dose this AM. So far, it has not gone up. She states her fasting BGs have been 120s-130s most days for the past few weeks, but goes up to low 300s throughout the day and she's not able to get it down for several hours. On Toujeo 20 units daily and ozempic 1 mg weekly. She is using [address] reader. Please advise.   "

## 2025-05-28 NOTE — TELEPHONE ENCOUNTER
----- Message from Gladis sent at 5/28/2025  9:34 AM CDT -----  Contact: self  Type:  Needs Medical AdviceWho Called: selfSymptoms (please be specific): pt is needing some advice on medication she needs to take. Pt started taking predniSONE (DELTASONE) 20 MG tablet. Needs to know if she needs to increase her insulinWould the patient rather a call back or a response via [facility]? Call Best Call Back Number: [phone] (home) Additional Information: please advise and thank you

## 2025-05-28 NOTE — TELEPHONE ENCOUNTER
I will send in Novolog pens for her to use before meals, as needed, according to sliding scale. To use during steroid course.    150-200 + 2 units  201-250 + 4 units  251-300 + 6 units  301-350 + 8 units   > 351 + 10 units      If blood sugars remain elevated in 300s after stopping steroid, she can let us know so we can download reader.

## 2025-07-24 ENCOUNTER — APPOINTMENT (OUTPATIENT)
Dept: LAB | Facility: HOSPITAL | Age: 80
End: 2025-07-24
Attending: NURSE PRACTITIONER
Payer: MEDICARE

## 2025-07-24 ENCOUNTER — TELEPHONE (OUTPATIENT)
Dept: ENDOCRINOLOGY | Facility: CLINIC | Age: 80
End: 2025-07-24
Payer: MEDICARE

## 2025-07-25 DIAGNOSIS — E11.69 TYPE 2 DIABETES MELLITUS WITH OTHER SPECIFIED COMPLICATION, WITH LONG-TERM CURRENT USE OF INSULIN: ICD-10-CM

## 2025-07-25 DIAGNOSIS — Z79.4 TYPE 2 DIABETES MELLITUS WITH OTHER SPECIFIED COMPLICATION, WITH LONG-TERM CURRENT USE OF INSULIN: ICD-10-CM

## 2025-07-25 RX ORDER — SEMAGLUTIDE 1.34 MG/ML
1 INJECTION, SOLUTION SUBCUTANEOUS
Qty: 4 EACH | Refills: 0 | Status: SHIPPED | OUTPATIENT
Start: 2025-07-25 | End: 2026-07-25

## 2025-07-30 ENCOUNTER — TELEPHONE (OUTPATIENT)
Dept: ENDOCRINOLOGY | Facility: CLINIC | Age: 80
End: 2025-07-30
Payer: MEDICARE

## 2025-07-30 NOTE — PROGRESS NOTES
CC: Ms. Roseanne Gonzalez arrives today for management of Type 2 DM and review of chronic medical conditions, as listed in the Visit Diagnosis section of this encounter.       HPI: Ms. Roseanne Gonzalez was diagnosed with Type 2 DM in 2007. She was diagnosed based on lab work following CABG. Initial treatment consisted of metformin. Insulin added about 10 years after diagnosis. Denies FH of DM. Denies hospitalizations due to DM.   She has diagnosis of CAD with past CABG. Follows with cardiology.   She also has hyperthyroidism and takes methimazole.     Patient was last seen by me in January.    History of Present Illness    CHIEF COMPLAINT:  - Patient presents for a diabetes follow-up visit and to discuss ongoing back pain issues.    HPI:  - Patient reports severe arthritis throughout her body, causing significant pain when bending over and difficulty performing tasks such as putting down dog food. Squatting is particularly problematic. She is scheduled for an epidural injection for pain management, pending approval from Dr. Chew due to a blood-related issue. She has been evaluated by multiple doctors for pain management, including Dr. Zhang, Dr. Reinoso, and Dr. Krueger, to explore treatment options including potential surgery. Dr. Zhang advised her to avoid the gym until obtaining pain relief, as exercise was causing severe pain and nausea.  - Regarding diabetes management, she reports high blood sugar after meals, with readings sometimes reaching the mid-200s to 300s, and one instance of nearly 400. She notes that coffee seems to affect blood sugar. Low blood sugar episodes, typically in the 60s, occur in the evenings, causing shakiness and jitteriness. She treats these episodes with chocolate kept by the bedside.  - She reports occasional, intermittent numbness and tingling in the left foot, as well as foot cramps while sleeping.  - She denies recent chest pain, palpitations, coughing, shortness of breath,  "current abdominal issues, or abdominal pain.           BG monitoring per [employer].     Hypoglycemia: Occasional after Novolog administration. Giving Novolog after meal.   Hypoglycemic Symptoms: fatigue  Hypoglycemia Treatment: 1/2 candy bar, PB    Missing Insulin/PO medication doses: No    Dietary Habits: Eats 2-3 meals/day. Avoids sugary beverages.    Last DM education appointment: 6/2021      CURRENT DIABETIC MEDS: Farxiga 5 mg daily, Ozempic 1 mg weekly, Toujeo 20 units every afternoon, Novolog correction scale, target 150, ISF 25 -- receives all from [facility]  Vial or pen: pen  Glucometer type:     Previous DM treatments:  Metformin - diarrhea   Januvia - rash?  Trulicity     Last Eye Exam: 6/30/2025, no DRNayana Caraballo (@ Dr. Boo's office)  Last Podiatry Exam: n/a    REVIEW OF SYSTEMS  Constitutional: no c/o fatigue, weakness,. + 4#  weight loss  Cardiac: no palpitations or chest pain.  Respiratory: no cough or dyspnea.  GI: no c/o nausea, abdominal pain. Denies h/o pancreatitis.   Skin: no lesions or rashes.   Neuro: no tingling, or parasthesias. + intermittent numbness in L foot only since CABG (L graft site). Also had L sided DDD of spine.  Endocrine: denies polyphagia, polydipsia, polyuria      Personally reviewed Past Medical, Surgical, Social History.    Vital Signs  /62 (BP Location: Left arm, Patient Position: Sitting)   Pulse 62   Resp 18   Ht 5' 4" (1.626 m)   Wt 68.2 kg (150 lb 7.4 oz)   SpO2 97%   BMI 25.83 kg/m²     Personally reviewed the below labs:    Hemoglobin A1C   Date Value Ref Range Status   01/28/2025 6.4 (H) 4.0 - 5.6 % Final     Comment:     ADA Screening Guidelines:  5.7-6.4%  Consistent with prediabetes  >or=6.5%  Consistent with diabetes    High levels of fetal hemoglobin interfere with the HbA1C  assay. Heterozygous hemoglobin variants (HbS, HgC, etc)do  not significantly interfere with this assay.   However, presence of multiple variants may affect " accuracy.     07/18/2024 6.3 (H) 4.0 - 5.6 % Final     Comment:     ADA Screening Guidelines:  5.7-6.4%  Consistent with prediabetes  >or=6.5%  Consistent with diabetes    High levels of fetal hemoglobin interfere with the HbA1C  assay. Heterozygous hemoglobin variants (HbS, HgC, etc)do  not significantly interfere with this assay.   However, presence of multiple variants may affect accuracy.     03/05/2024 6.4 (H) 4.0 - 5.6 % Final     Comment:     ADA Screening Guidelines:  5.7-6.4%  Consistent with prediabetes  >or=6.5%  Consistent with diabetes    High levels of fetal hemoglobin interfere with the HbA1C  assay. Heterozygous hemoglobin variants (HbS, HgC, etc)do  not significantly interfere with this assay.   However, presence of multiple variants may affect accuracy.       Hemoglobin A1c   Date Value Ref Range Status   07/24/2025 7.0 (H) 4.0 - 5.6 % Final     Comment:     ADA Screening Guidelines:  5.7-6.4%  Consistent with prediabetes  >=6.5%  Consistent with diabetes    High levels of fetal hemoglobin interfere with the HbA1C  assay. Heterozygous hemoglobin variants (HbS, HgC, etc)do  not significantly interfere with this assay.   However, presence of multiple variants may affect accuracy.       Chemistry        Component Value Date/Time     07/24/2025 1249     01/28/2025 0841    K 3.9 07/24/2025 1249    K 4.0 01/28/2025 0841     07/24/2025 1249     01/28/2025 0841    CO2 29 07/24/2025 1249    CO2 27 01/28/2025 0841    BUN 21 07/24/2025 1249    CREATININE 0.9 07/24/2025 1249     (H) 07/24/2025 1249    GLU 96 01/28/2025 0841        Component Value Date/Time    CALCIUM 8.7 07/24/2025 1249    CALCIUM 9.2 01/28/2025 0841    ALKPHOS 46 07/24/2025 1249    ALKPHOS 40 01/28/2025 0841    AST 29 07/24/2025 1249    AST 22 01/28/2025 0841    ALT 22 07/24/2025 1249    ALT 17 01/28/2025 0841    BILITOT 0.5 07/24/2025 1249    BILITOT 0.8 01/28/2025 0841    ESTGFRAFRICA >60.0 04/30/2021 0752     EGFRNONAA 69 02/17/2022 0829          Lab Results   Component Value Date    CHOL 137 01/28/2025    CHOL 113 (L) 03/05/2024    CHOL 124 11/17/2022     Lab Results   Component Value Date    HDL 57 01/28/2025    HDL 53 03/05/2024    HDL 52 11/17/2022     Lab Results   Component Value Date    LDLCALC 67.2 01/28/2025    LDLCALC 50.6 (L) 03/05/2024    LDLCALC 58 11/17/2022     Lab Results   Component Value Date    TRIG 64 01/28/2025    TRIG 47 03/05/2024    TRIG 66 11/17/2022     Lab Results   Component Value Date    CHOLHDL 41.6 01/28/2025    CHOLHDL 46.9 03/05/2024    CHOLHDL 28.4 04/30/2021       Lab Results   Component Value Date    MICALBCREAT 9.9 07/24/2025     Lab Results   Component Value Date    TSH 1.926 07/24/2025       Estimated Creatinine Clearance: 48.9 mL/min (based on SCr of 0.9 mg/dL).    Vit D, 25-Hydroxy   Date Value Ref Range Status   11/06/2017 41 30 - 96 ng/mL Final     Comment:     Vitamin D deficiency.........<10 ng/mL                              Vitamin D insufficiency......10-29 ng/mL       Vitamin D sufficiency........> or equal to 30 ng/mL  Vitamin D toxicity............>100 ng/mL           PHYSICAL EXAMINATION  Constitutional: Appears well, no distress  Respiratory: CTA, even and unlabored.  Cardiovascular: RRR, no murmurs, no carotid bruits.   GI: bowel sounds active, no hernia noted  Skin: warm and dry; no visible wounds  Neuro: oriented to person, place, time  Feet: appropriate footwear.      FREESTYLE [employer] DOWNLOAD: Unable to download, due to software error.              Goals        HEMOGLOBIN A1C < 7                     Assessment & Plan    E11.69, Z79.4 Type 2 diabetes mellitus with other specified complication, with long-term current use of insulin  I25.10 Coronary artery disease involving native coronary artery of native heart without angina pectoris  I10 Primary hypertension  E05.90 Hyperthyroidism  E78.5 Hyperlipidemia, unspecified hyperlipidemia type    TYPE 2 DIABETES MELLITUS  WITH OTHER SPECIFIED COMPLICATION, WITH LONG-TERM CURRENT USE OF INSULIN:  - A1C increased from 6.4% to 7% since January, goal is to bring it below 7%   - Increased Ozempic from 1 mg to 2 mg weekly to address post-meal glucose spikes (pending patient assistance approval)   - Advised using Novolog sliding scale based on pre-meal glucose to avoid hypoglycemia   - Continued Toujeo 20 units in the afternoon   - Continued Farxiga 5 mg in the morning  - Explained that coffee can raise blood sugars Patient to limit carbohydrates to 1/4 of the plate during meals    - Educated on faster-acting carbohydrates for treating hypoglycemia (glucose tablets, juice) compared to chocolate   - Recommend obtaining [employer] brand glucometer and test strips from [facility] [employer] for backup glucose testing and confirming CGM readings, especially when treating lows.    PRIMARY HYPERTENSION:  - BP well-controlled on current regimen Continued Losartan and HCTZ for BP control    HYPERTHYROIDISM:  - Thyroid level stable on current methimazole dosing Continued methimazole 3 days a week for hyperthyroidism    HYPERLIPIDEMIA, UNSPECIFIED HYPERLIPIDEMIA TYPE:  - Continued Crestor for high cholesterol Follow up with associated labs, including lipid panel           FOLLOW UP  Follow up in about 6 months (around 1/31/2026).   Patient instructed to bring BG logs to each follow up   Patient encouraged to call for any BG/medication issues, concerns, or questions.    Orders Placed This Encounter   Procedures    Hemoglobin A1C    Comprehensive Metabolic Panel    Lipid Panel     This note was generated with the assistance of ambient listening technology. Verbal consent was obtained by the patient and accompanying visitor(s) for the recording of patient appointment to facilitate this note. I attest to having reviewed and edited the generated note for accuracy, though some syntax or spelling errors may persist. Please contact the author of this note for any  clarification.

## 2025-07-31 ENCOUNTER — OFFICE VISIT (OUTPATIENT)
Dept: ENDOCRINOLOGY | Facility: CLINIC | Age: 80
End: 2025-07-31
Payer: MEDICARE

## 2025-07-31 VITALS
SYSTOLIC BLOOD PRESSURE: 108 MMHG | HEIGHT: 64 IN | RESPIRATION RATE: 18 BRPM | HEART RATE: 62 BPM | OXYGEN SATURATION: 97 % | BODY MASS INDEX: 25.68 KG/M2 | WEIGHT: 150.44 LBS | DIASTOLIC BLOOD PRESSURE: 62 MMHG

## 2025-07-31 DIAGNOSIS — I25.10 CORONARY ARTERY DISEASE INVOLVING NATIVE CORONARY ARTERY OF NATIVE HEART WITHOUT ANGINA PECTORIS: ICD-10-CM

## 2025-07-31 DIAGNOSIS — E78.5 HYPERLIPIDEMIA, UNSPECIFIED HYPERLIPIDEMIA TYPE: ICD-10-CM

## 2025-07-31 DIAGNOSIS — E11.69 TYPE 2 DIABETES MELLITUS WITH OTHER SPECIFIED COMPLICATION, WITH LONG-TERM CURRENT USE OF INSULIN: Primary | ICD-10-CM

## 2025-07-31 DIAGNOSIS — I10 PRIMARY HYPERTENSION: ICD-10-CM

## 2025-07-31 DIAGNOSIS — Z79.4 TYPE 2 DIABETES MELLITUS WITH OTHER SPECIFIED COMPLICATION, WITH LONG-TERM CURRENT USE OF INSULIN: Primary | ICD-10-CM

## 2025-07-31 DIAGNOSIS — E05.90 HYPERTHYROIDISM: ICD-10-CM

## 2025-07-31 PROCEDURE — 99214 OFFICE O/P EST MOD 30 MIN: CPT | Mod: S$GLB,,, | Performed by: NURSE PRACTITIONER

## 2025-07-31 PROCEDURE — 3078F DIAST BP <80 MM HG: CPT | Mod: CPTII,S$GLB,, | Performed by: NURSE PRACTITIONER

## 2025-07-31 PROCEDURE — 1101F PT FALLS ASSESS-DOCD LE1/YR: CPT | Mod: CPTII,S$GLB,, | Performed by: NURSE PRACTITIONER

## 2025-07-31 PROCEDURE — 3288F FALL RISK ASSESSMENT DOCD: CPT | Mod: CPTII,S$GLB,, | Performed by: NURSE PRACTITIONER

## 2025-07-31 PROCEDURE — 99999 PR PBB SHADOW E&M-EST. PATIENT-LVL V: CPT | Mod: PBBFAC,,, | Performed by: NURSE PRACTITIONER

## 2025-07-31 PROCEDURE — 1125F AMNT PAIN NOTED PAIN PRSNT: CPT | Mod: CPTII,S$GLB,, | Performed by: NURSE PRACTITIONER

## 2025-07-31 PROCEDURE — 1160F RVW MEDS BY RX/DR IN RCRD: CPT | Mod: CPTII,S$GLB,, | Performed by: NURSE PRACTITIONER

## 2025-07-31 PROCEDURE — 3074F SYST BP LT 130 MM HG: CPT | Mod: CPTII,S$GLB,, | Performed by: NURSE PRACTITIONER

## 2025-07-31 PROCEDURE — G2211 COMPLEX E/M VISIT ADD ON: HCPCS | Mod: S$GLB,,, | Performed by: NURSE PRACTITIONER

## 2025-07-31 PROCEDURE — 1159F MED LIST DOCD IN RCRD: CPT | Mod: CPTII,S$GLB,, | Performed by: NURSE PRACTITIONER

## 2025-07-31 RX ORDER — CARVEDILOL 12.5 MG/1
12.5 TABLET ORAL 2 TIMES DAILY WITH MEALS
COMMUNITY

## 2025-07-31 RX ORDER — SEMAGLUTIDE 2.68 MG/ML
2 INJECTION, SOLUTION SUBCUTANEOUS
Start: 2025-07-31 | End: 2026-07-31

## 2025-07-31 NOTE — PATIENT INSTRUCTIONS
Use Novolog correction scale based on pre-meal blood sugar:  150-200 + 2 units  201-250 + 4 units  251-300 + 6 units  301-350 + 8 units  > 351 + 10 units

## 2025-08-05 ENCOUNTER — TELEPHONE (OUTPATIENT)
Dept: PHARMACY | Facility: CLINIC | Age: 80
End: 2025-08-05
Payer: MEDICARE

## 2025-08-05 NOTE — TELEPHONE ENCOUNTER
Ozempic 2mg dose change request has been submitted to the [facility] Patient Assistance Program for review.

## 2025-08-14 DIAGNOSIS — E11.69 TYPE 2 DIABETES MELLITUS WITH OTHER SPECIFIED COMPLICATION, WITH LONG-TERM CURRENT USE OF INSULIN: Primary | ICD-10-CM

## 2025-08-14 DIAGNOSIS — Z79.4 TYPE 2 DIABETES MELLITUS WITH OTHER SPECIFIED COMPLICATION, WITH LONG-TERM CURRENT USE OF INSULIN: Primary | ICD-10-CM

## 2025-08-14 RX ORDER — SEMAGLUTIDE 1.34 MG/ML
1 INJECTION, SOLUTION SUBCUTANEOUS
Qty: 4 EACH | Refills: 0 | Status: SHIPPED | OUTPATIENT
Start: 2025-08-14 | End: 2026-08-14

## 2025-08-15 DIAGNOSIS — Z79.4 TYPE 2 DIABETES MELLITUS WITH OTHER SPECIFIED COMPLICATION, WITH LONG-TERM CURRENT USE OF INSULIN: ICD-10-CM

## 2025-08-15 DIAGNOSIS — E11.69 TYPE 2 DIABETES MELLITUS WITH OTHER SPECIFIED COMPLICATION, WITH LONG-TERM CURRENT USE OF INSULIN: ICD-10-CM

## 2025-08-15 RX ORDER — INSULIN ASPART 100 [IU]/ML
INJECTION, SOLUTION INTRAVENOUS; SUBCUTANEOUS
Qty: 15 ML | Refills: 0 | Status: SHIPPED | OUTPATIENT
Start: 2025-08-15

## 2025-08-15 RX ORDER — PEN NEEDLE, DIABETIC 30 GX3/16"
1 NEEDLE, DISPOSABLE MISCELLANEOUS 4 TIMES DAILY
Qty: 150 EACH | Refills: 1 | Status: SHIPPED | OUTPATIENT
Start: 2025-08-15

## 2025-08-18 RX ORDER — INSULIN GLARGINE 300 U/ML
INJECTION, SOLUTION SUBCUTANEOUS
Qty: 4.5 ML | Refills: 3 | Status: SHIPPED | OUTPATIENT
Start: 2025-08-18